# Patient Record
Sex: FEMALE | Race: WHITE
[De-identification: names, ages, dates, MRNs, and addresses within clinical notes are randomized per-mention and may not be internally consistent; named-entity substitution may affect disease eponyms.]

---

## 2021-01-15 PROBLEM — Z00.00 ENCOUNTER FOR PREVENTIVE HEALTH EXAMINATION: Status: ACTIVE | Noted: 2021-01-15

## 2021-01-21 ENCOUNTER — APPOINTMENT (OUTPATIENT)
Dept: CARDIOTHORACIC SURGERY | Facility: CLINIC | Age: 86
End: 2021-01-21
Payer: MEDICARE

## 2021-01-21 DIAGNOSIS — I27.20 PULMONARY HYPERTENSION, UNSPECIFIED: ICD-10-CM

## 2021-01-21 DIAGNOSIS — N18.30 CHRONIC KIDNEY DISEASE, STAGE 3 UNSPECIFIED: ICD-10-CM

## 2021-01-21 DIAGNOSIS — I25.9 CHRONIC ISCHEMIC HEART DISEASE, UNSPECIFIED: ICD-10-CM

## 2021-01-21 DIAGNOSIS — E78.5 HYPERLIPIDEMIA, UNSPECIFIED: ICD-10-CM

## 2021-01-21 DIAGNOSIS — I34.0 NONRHEUMATIC MITRAL (VALVE) INSUFFICIENCY: ICD-10-CM

## 2021-01-21 PROCEDURE — 99205 OFFICE O/P NEW HI 60 MIN: CPT

## 2021-01-25 VITALS
WEIGHT: 139 LBS | OXYGEN SATURATION: 85 % | BODY MASS INDEX: 26.24 KG/M2 | DIASTOLIC BLOOD PRESSURE: 80 MMHG | HEIGHT: 61 IN | SYSTOLIC BLOOD PRESSURE: 130 MMHG

## 2021-01-25 PROBLEM — I25.9 CHRONIC ISCHEMIC HEART DISEASE: Status: ACTIVE | Noted: 2021-01-25

## 2021-01-25 PROBLEM — I27.20 PULMONARY HYPERTENSION: Status: ACTIVE | Noted: 2021-01-25

## 2021-01-25 PROBLEM — N18.30 STAGE 3 CHRONIC KIDNEY DISEASE, UNSPECIFIED WHETHER STAGE 3A OR 3B CKD: Status: ACTIVE | Noted: 2021-01-25

## 2021-01-25 PROBLEM — I34.0 MITRAL VALVE REGURGITATION: Status: ACTIVE | Noted: 2021-01-25

## 2021-01-25 PROBLEM — E78.5 HYPERLIPEMIA: Status: ACTIVE | Noted: 2021-01-25

## 2021-01-25 RX ORDER — SIMVASTATIN 20 MG/1
20 TABLET, FILM COATED ORAL
Refills: 0 | Status: ACTIVE | COMMUNITY

## 2021-01-25 RX ORDER — FUROSEMIDE 20 MG/1
20 TABLET ORAL
Refills: 0 | Status: ACTIVE | COMMUNITY

## 2021-01-25 RX ORDER — MULTIVIT-MIN/IRON/FOLIC ACID/K 18-600-40
500 CAPSULE ORAL
Refills: 0 | Status: ACTIVE | COMMUNITY

## 2021-01-25 RX ORDER — LEVOTHYROXINE SODIUM 75 UG/1
75 TABLET ORAL
Refills: 0 | Status: ACTIVE | COMMUNITY

## 2021-01-25 RX ORDER — FAMOTIDINE 20 MG/1
20 TABLET, FILM COATED ORAL
Refills: 0 | Status: ACTIVE | COMMUNITY

## 2021-01-25 RX ORDER — PSYLLIUM HUSK 0.4 G
CAPSULE ORAL
Refills: 0 | Status: ACTIVE | COMMUNITY

## 2021-01-25 RX ORDER — ADHESIVE TAPE 3"X 2.3 YD
50 MCG TAPE, NON-MEDICATED TOPICAL
Refills: 0 | Status: ACTIVE | COMMUNITY

## 2021-01-25 RX ORDER — BIOTIN 10 MG
10000 TABLET ORAL
Refills: 0 | Status: ACTIVE | COMMUNITY

## 2021-01-25 RX ORDER — MAGNESIUM OXIDE/MAG AA CHELATE 300 MG
CAPSULE ORAL
Refills: 0 | Status: ACTIVE | COMMUNITY

## 2021-01-25 RX ORDER — SILDENAFIL 20 MG/1
20 TABLET ORAL
Refills: 0 | Status: ACTIVE | COMMUNITY

## 2021-01-25 RX ORDER — METOPROLOL SUCCINATE 25 MG/1
25 TABLET, EXTENDED RELEASE ORAL
Refills: 0 | Status: ACTIVE | COMMUNITY

## 2021-01-26 NOTE — PHYSICAL EXAM
[General Appearance - Alert] : alert [General Appearance - In No Acute Distress] : in no acute distress [Sclera] : the sclera and conjunctiva were normal [PERRL With Normal Accommodation] : pupils were equal in size, round, and reactive to light [Extraocular Movements] : extraocular movements were intact [Outer Ear] : the ears and nose were normal in appearance [Oropharynx] : the oropharynx was normal [Neck Cervical Mass (___cm)] : no neck mass was observed [Neck Appearance] : the appearance of the neck was normal [Jugular Venous Distention Increased] : there was no jugular-venous distention [Thyroid Diffuse Enlargement] : the thyroid was not enlarged [Thyroid Nodule] : there were no palpable thyroid nodules [Respiration, Rhythm And Depth] : normal respiratory rhythm and effort [Apical Impulse] : the apical impulse was normal [Heart Sounds] : normal S1 and S2 [Examination Of The Chest] : the chest was normal in appearance [FreeTextEntry1] : sternum well healed  [Varicose Veins Of The Right Leg] : the patient has no varicose veins of the right leg [Varicose Veins Of The Left Leg] : the patient has no varicose veins of the left leg [Bowel Sounds] : normal bowel sounds [Abdomen Soft] : soft [Abdomen Tenderness] : non-tender [Abdomen Mass (___ Cm)] : no abdominal mass palpated [No CVA Tenderness] : no ~M costovertebral angle tenderness [Abnormal Walk] : normal gait [Nail Clubbing] : no clubbing  or cyanosis of the fingernails [Musculoskeletal - Swelling] : no joint swelling seen [Motor Tone] : muscle strength and tone were normal [Skin Color & Pigmentation] : normal skin color and pigmentation [] : no rash [Skin Turgor] : normal skin turgor [Deep Tendon Reflexes (DTR)] : deep tendon reflexes were 2+ and symmetric [Sensation] : the sensory exam was normal to light touch and pinprick [No Focal Deficits] : no focal deficits [Oriented To Time, Place, And Person] : oriented to person, place, and time [Affect] : the affect was normal [Impaired Insight] : insight and judgment were intact

## 2021-01-26 NOTE — HISTORY OF PRESENT ILLNESS
[FreeTextEntry1] : Ms. Mora is a 89 year old female referred for consultation by Dr. Hernandes. She has a Past Medical History pertinent for CAD, CKD (Stage III), HTN, Anemia, COPD, CHF, Pulmonary Hypertension and Severe Mitral Valve Regurgitation.  In 2010 she underwent a bio-AVR with a 21mm Billy Mitroflow.  Patient reporting progressively worsening fatigue and SOB.  Now requiring oxygen mostly around the clock.  Multiple admissions for CHF.  Recent ECHO at [place] shows moderate to severe AI (PVL versus central not characterized on TTE), along with moderate to severe MR.\par \par The patient's past medical history, past surgical history, family history, social history, allergies, medications, and multisystem review of systems were individually reviewed with the patient.  There are no pertinent additions or subtractions.  The patient was personally seen and examined.\par

## 2021-01-26 NOTE — REVIEW OF SYSTEMS
[Feeling Poorly] : feeling poorly [Feeling Tired] : feeling tired [As Noted in HPI] : as noted in HPI [Shortness Of Breath] : shortness of breath [SOB on Exertion] : shortness of breath during exertion [Orthopnea] : orthopnea [Negative] : Heme/Lymph [Fever] : no fever [Chills] : no chills [Recent Weight Gain (___ Lbs)] : no recent weight gain [Recent Weight Loss (___ Lbs)] : no recent weight loss [Wheezing] : no wheezing [Cough] : no cough [PND] : no PND

## 2021-01-26 NOTE — ASSESSMENT
[FreeTextEntry1] : The patient is a very pleasant 89-year-old female. Her cardiovascular his factors include hypertension, coronary artery disease, and chronic renal insufficiency. She is status post aVR in 2010 with a 21 mm Mitroflow valve.  Operative report was reviewed. She also underwent coronary artery bypass grafting with LIMA to the LAD. The patient reports aggressively worsening exertional dyspnea. She is now on oxygen "most of the time." She reports worsening fatigue and lower extremity edema. She has suffered multiple admissions for acute on chronic diastolic heart failure. Most recently, in early January she was admitted to [facility]. Echocardiogram shows moderate to severe bioprostatic aortic valve regurgitation, and moderately severe mitral valve regurgitation. She is now referred for consideration for TAV in [place].\par \par I had a lengthy conversation with the patient about transcatheter valve replacement into her existing failed biologic valve. Additional workup however will be required. We will schedule both a LUCRECIA along with cardiac catheterization at [facility]. A LUCRECIA will characterize the aortic regurgitation as either central, versus paravalvular. We cannot effectively treat paravalvular regurgitation with a transcatheter approach. Having said this, the Billy Mitroflow valve is prone to structural deterioration with regurgitation as the dominant sequelae. The LUCRECIA will also quantify the mitral regurgitation. Down the road if TAv in [place] does not sufficiently improve her symptoms, she can be considered for mitral clip.\par \par Dedicated CT angiogram will also be required.  We'll obtain documentation about her renal insufficiency and consult with her nephrologist. Serum creatinine will need to be checked between each diagnostic procedure and ultimately prior to transcatheter aortic valve replacement.\par \par Risks benefits and alternatives to transcatheter aortic valve placement were discussed with the patient in detail.  Risks discussed included, but not limited to infection, bleeding, myocardial infarction, cerebrovascular accident, renal failure,  vascular injury requiring intervention, cardiac rupture, and death.  In addition, a roughly 5-10% risk of permanent pacemaker requirement was highlighted.   Furthermore, the patient's STS score and its significance were discussed directly with the patient and family.\par I would like to thank you for referring this patient to my attention and for allowing me to participate in her care.  If there are any further questions, or I can be of any further assistance, please do not hesitate contacting me at any time.\par

## 2021-01-29 ENCOUNTER — TRANSCRIPTION ENCOUNTER (OUTPATIENT)
Age: 86
End: 2021-01-29

## 2021-01-29 ENCOUNTER — OUTPATIENT (OUTPATIENT)
Dept: OUTPATIENT SERVICES | Facility: HOSPITAL | Age: 86
LOS: 1 days | End: 2021-01-29
Payer: MEDICARE

## 2021-02-19 PROBLEM — I34.0 NONRHEUMATIC MITRAL (VALVE) INSUFFICIENCY: Chronic | Status: ACTIVE | Noted: 2021-01-29

## 2021-02-19 PROBLEM — E78.5 HYPERLIPIDEMIA, UNSPECIFIED: Chronic | Status: ACTIVE | Noted: 2021-01-29

## 2021-02-19 PROBLEM — I25.10 ATHEROSCLEROTIC HEART DISEASE OF NATIVE CORONARY ARTERY WITHOUT ANGINA PECTORIS: Chronic | Status: ACTIVE | Noted: 2021-01-29

## 2021-02-19 PROBLEM — I27.20 PULMONARY HYPERTENSION, UNSPECIFIED: Chronic | Status: ACTIVE | Noted: 2021-01-29

## 2021-02-19 PROBLEM — N18.9 CHRONIC KIDNEY DISEASE, UNSPECIFIED: Chronic | Status: ACTIVE | Noted: 2021-01-29

## 2021-02-22 ENCOUNTER — INPATIENT (INPATIENT)
Facility: HOSPITAL | Age: 86
LOS: 2 days | Discharge: HOME | DRG: 291 | End: 2021-02-25
Attending: THORACIC SURGERY (CARDIOTHORACIC VASCULAR SURGERY) | Admitting: STUDENT IN AN ORGANIZED HEALTH CARE EDUCATION/TRAINING PROGRAM
Payer: MEDICARE

## 2021-02-22 VITALS
TEMPERATURE: 98 F | HEIGHT: 61 IN | WEIGHT: 136.03 LBS | OXYGEN SATURATION: 86 % | HEART RATE: 88 BPM | RESPIRATION RATE: 26 BRPM | SYSTOLIC BLOOD PRESSURE: 152 MMHG | DIASTOLIC BLOOD PRESSURE: 64 MMHG

## 2021-02-22 DIAGNOSIS — Z95.2 PRESENCE OF PROSTHETIC HEART VALVE: Chronic | ICD-10-CM

## 2021-02-22 DIAGNOSIS — I50.9 HEART FAILURE, UNSPECIFIED: ICD-10-CM

## 2021-02-22 DIAGNOSIS — Z98.890 OTHER SPECIFIED POSTPROCEDURAL STATES: Chronic | ICD-10-CM

## 2021-02-22 LAB
ALBUMIN SERPL ELPH-MCNC: 4.2 G/DL — SIGNIFICANT CHANGE UP (ref 3.3–5.2)
ALP SERPL-CCNC: 47 U/L — SIGNIFICANT CHANGE UP (ref 40–120)
ALT FLD-CCNC: 8 U/L — SIGNIFICANT CHANGE UP
ANION GAP SERPL CALC-SCNC: 13 MMOL/L — SIGNIFICANT CHANGE UP (ref 5–17)
APTT BLD: 32.2 SEC — SIGNIFICANT CHANGE UP (ref 27.5–35.5)
AST SERPL-CCNC: 21 U/L — SIGNIFICANT CHANGE UP
BASOPHILS # BLD AUTO: 0.07 K/UL — SIGNIFICANT CHANGE UP (ref 0–0.2)
BASOPHILS NFR BLD AUTO: 0.7 % — SIGNIFICANT CHANGE UP (ref 0–2)
BILIRUB SERPL-MCNC: 0.5 MG/DL — SIGNIFICANT CHANGE UP (ref 0.4–2)
BUN SERPL-MCNC: 29 MG/DL — HIGH (ref 8–20)
CALCIUM SERPL-MCNC: 10.3 MG/DL — HIGH (ref 8.6–10.2)
CHLORIDE SERPL-SCNC: 108 MMOL/L — HIGH (ref 98–107)
CO2 SERPL-SCNC: 19 MMOL/L — LOW (ref 22–29)
CREAT SERPL-MCNC: 1.57 MG/DL — HIGH (ref 0.5–1.3)
EOSINOPHIL # BLD AUTO: 0.12 K/UL — SIGNIFICANT CHANGE UP (ref 0–0.5)
EOSINOPHIL NFR BLD AUTO: 1.2 % — SIGNIFICANT CHANGE UP (ref 0–6)
GLUCOSE SERPL-MCNC: 83 MG/DL — SIGNIFICANT CHANGE UP (ref 70–99)
HCT VFR BLD CALC: 34.3 % — LOW (ref 34.5–45)
HGB BLD-MCNC: 11 G/DL — LOW (ref 11.5–15.5)
IMM GRANULOCYTES NFR BLD AUTO: 0.4 % — SIGNIFICANT CHANGE UP (ref 0–1.5)
INR BLD: 1 RATIO — SIGNIFICANT CHANGE UP (ref 0.88–1.16)
LYMPHOCYTES # BLD AUTO: 0.93 K/UL — LOW (ref 1–3.3)
LYMPHOCYTES # BLD AUTO: 9.5 % — LOW (ref 13–44)
MAGNESIUM SERPL-MCNC: 1.8 MG/DL — SIGNIFICANT CHANGE UP (ref 1.6–2.6)
MCHC RBC-ENTMCNC: 27.8 PG — SIGNIFICANT CHANGE UP (ref 27–34)
MCHC RBC-ENTMCNC: 32.1 GM/DL — SIGNIFICANT CHANGE UP (ref 32–36)
MCV RBC AUTO: 86.8 FL — SIGNIFICANT CHANGE UP (ref 80–100)
MONOCYTES # BLD AUTO: 0.85 K/UL — SIGNIFICANT CHANGE UP (ref 0–0.9)
MONOCYTES NFR BLD AUTO: 8.7 % — SIGNIFICANT CHANGE UP (ref 2–14)
NEUTROPHILS # BLD AUTO: 7.79 K/UL — HIGH (ref 1.8–7.4)
NEUTROPHILS NFR BLD AUTO: 79.5 % — HIGH (ref 43–77)
NT-PROBNP SERPL-SCNC: 2903 PG/ML — HIGH (ref 0–300)
PLATELET # BLD AUTO: 223 K/UL — SIGNIFICANT CHANGE UP (ref 150–400)
POTASSIUM SERPL-MCNC: 4.1 MMOL/L — SIGNIFICANT CHANGE UP (ref 3.5–5.3)
POTASSIUM SERPL-SCNC: 4.1 MMOL/L — SIGNIFICANT CHANGE UP (ref 3.5–5.3)
PROT SERPL-MCNC: 7.6 G/DL — SIGNIFICANT CHANGE UP (ref 6.6–8.7)
PROTHROM AB SERPL-ACNC: 11.6 SEC — SIGNIFICANT CHANGE UP (ref 10.6–13.6)
RBC # BLD: 3.95 M/UL — SIGNIFICANT CHANGE UP (ref 3.8–5.2)
RBC # FLD: 15.8 % — HIGH (ref 10.3–14.5)
SARS-COV-2 RNA SPEC QL NAA+PROBE: SIGNIFICANT CHANGE UP
SODIUM SERPL-SCNC: 140 MMOL/L — SIGNIFICANT CHANGE UP (ref 135–145)
TROPONIN T SERPL-MCNC: 0.02 NG/ML — SIGNIFICANT CHANGE UP (ref 0–0.06)
WBC # BLD: 9.8 K/UL — SIGNIFICANT CHANGE UP (ref 3.8–10.5)
WBC # FLD AUTO: 9.8 K/UL — SIGNIFICANT CHANGE UP (ref 3.8–10.5)

## 2021-02-22 PROCEDURE — 71045 X-RAY EXAM CHEST 1 VIEW: CPT | Mod: 26

## 2021-02-22 PROCEDURE — 99284 EMERGENCY DEPT VISIT MOD MDM: CPT | Mod: GC

## 2021-02-22 PROCEDURE — 93010 ELECTROCARDIOGRAM REPORT: CPT

## 2021-02-22 PROCEDURE — 99223 1ST HOSP IP/OBS HIGH 75: CPT

## 2021-02-22 RX ORDER — LEVOTHYROXINE SODIUM 125 MCG
75 TABLET ORAL DAILY
Refills: 0 | Status: DISCONTINUED | OUTPATIENT
Start: 2021-02-22 | End: 2021-02-25

## 2021-02-22 RX ORDER — ASPIRIN/CALCIUM CARB/MAGNESIUM 324 MG
325 TABLET ORAL ONCE
Refills: 0 | Status: COMPLETED | OUTPATIENT
Start: 2021-02-22 | End: 2021-02-22

## 2021-02-22 RX ORDER — SIMVASTATIN 20 MG/1
20 TABLET, FILM COATED ORAL AT BEDTIME
Refills: 0 | Status: DISCONTINUED | OUTPATIENT
Start: 2021-02-22 | End: 2021-02-25

## 2021-02-22 RX ORDER — METOPROLOL TARTRATE 50 MG
12.5 TABLET ORAL
Refills: 0 | Status: DISCONTINUED | OUTPATIENT
Start: 2021-02-23 | End: 2021-02-25

## 2021-02-22 RX ORDER — SODIUM CHLORIDE 9 MG/ML
3 INJECTION INTRAMUSCULAR; INTRAVENOUS; SUBCUTANEOUS EVERY 8 HOURS
Refills: 0 | Status: DISCONTINUED | OUTPATIENT
Start: 2021-02-22 | End: 2021-02-25

## 2021-02-22 RX ORDER — NITROGLYCERIN 6.5 MG
1 CAPSULE, EXTENDED RELEASE ORAL ONCE
Refills: 0 | Status: COMPLETED | OUTPATIENT
Start: 2021-02-22 | End: 2021-02-22

## 2021-02-22 RX ORDER — METOPROLOL TARTRATE 50 MG
25 TABLET ORAL DAILY
Refills: 0 | Status: DISCONTINUED | OUTPATIENT
Start: 2021-02-22 | End: 2021-02-22

## 2021-02-22 RX ORDER — AMLODIPINE BESYLATE 2.5 MG/1
10 TABLET ORAL DAILY
Refills: 0 | Status: DISCONTINUED | OUTPATIENT
Start: 2021-02-22 | End: 2021-02-23

## 2021-02-22 RX ORDER — HEPARIN SODIUM 5000 [USP'U]/ML
5000 INJECTION INTRAVENOUS; SUBCUTANEOUS EVERY 12 HOURS
Refills: 0 | Status: DISCONTINUED | OUTPATIENT
Start: 2021-02-22 | End: 2021-02-25

## 2021-02-22 RX ORDER — FUROSEMIDE 40 MG
20 TABLET ORAL ONCE
Refills: 0 | Status: COMPLETED | OUTPATIENT
Start: 2021-02-22 | End: 2021-02-22

## 2021-02-22 RX ADMIN — HEPARIN SODIUM 5000 UNIT(S): 5000 INJECTION INTRAVENOUS; SUBCUTANEOUS at 17:37

## 2021-02-22 RX ADMIN — Medication 20 MILLIGRAM(S): at 15:06

## 2021-02-22 RX ADMIN — Medication 20 MILLIGRAM(S): at 23:15

## 2021-02-22 RX ADMIN — SODIUM CHLORIDE 3 MILLILITER(S): 9 INJECTION INTRAMUSCULAR; INTRAVENOUS; SUBCUTANEOUS at 23:15

## 2021-02-22 RX ADMIN — SIMVASTATIN 20 MILLIGRAM(S): 20 TABLET, FILM COATED ORAL at 23:15

## 2021-02-22 NOTE — ED PROVIDER NOTE - PMH
CAD (coronary artery disease)    Chronic kidney disease, stage 3 to stage 5    HLD (hyperlipidemia)    Mitral valve regurgitation    Pulmonary HTN

## 2021-02-22 NOTE — CONSULT NOTE ADULT - ASSESSMENT
89 y/o F with PMH of HTN, anemia, COPD on home O2, HLD, CKD stage 3, severe mitral valve regurgitation, pHTN on Sildenafil, CAD with one vessel CABG (LIMA to LAD) with bio-prosthetic AVR (2010) presents with shortness of breath, worsening over the last 2 days. The patient is in the process of being worked up for a TAVR.     -Admit to CT Surgery - Dr. Denny service    Acute on chronic diastolic heart failure  - Lasix 20mg IV x 1 (given) will start standing diuretic  - c/w Metoprolol 25mg daily  -Monitor I/O  -Daily CXR  -Will discuss with Dr. Denny/Cards timing of cath and TAVR    Aortic insufficiency of bioaortic valve  - s/p AV replacement 11 years ago  - follows with Dr. Denny  - CT surgery made aware  - LUCRECIA from 1/29 noted    CAD   - c/w Metoprolol 25mg daily  - c/w Zocor 20mg daily    COPD on home O2  - c/w supplemental O2    Hypothyroidism  - c/w Synthroid 75mcg daily    Pulmonary hypertension  - c/w Sildenafil 20mg TID    DVT ppx  - Heparin SQ 89 y/o F with PMH of HTN, anemia, COPD on home O2, HLD, CKD stage 3, severe mitral valve regurgitation, pHTN on Sildenafil, CAD with one vessel CABG (LIMA to LAD) with bio-prosthetic AVR (2010) presents with shortness of breath, worsening over the last 2 days. The patient is in the process of being worked up for a TAVR.     -Admit to CT Surgery - Dr. Denny service    Acute on chronic diastolic heart failure  - Lasix 20mg IV x 1 (given) will start standing diuretic  - c/w Metoprolol 25mg daily  -Monitor I/O  -Daily CXR  -Will discuss with Dr. Denny/Cards timing of cath and TAVR CTA while inpatient in preparation for SO TAVR    Aortic insufficiency of bioaortic valve  - s/p AV replacement 11 years ago  - follows with Dr. Denny  - CT surgery made aware  - LUCRECIA from 1/29 noted    CAD   - c/w Metoprolol 25mg daily  - c/w Zocor 20mg daily    COPD on home O2  - c/w supplemental O2    Hypothyroidism  - c/w Synthroid 75mcg daily    Pulmonary hypertension  - c/w Sildenafil 20mg TID    DVT ppx  - Heparin SQ 89 y/o F with PMH of HTN, anemia, COPD on home O2, HLD, CKD stage 3, severe mitral valve regurgitation, pHTN on Sildenafil, CAD with one vessel CABG (LIMA to LAD) with bio-prosthetic AVR (2010) presents with shortness of breath, worsening over the last 2 days. The patient is in the process of being worked up for a TAVR.     -Admit to CT Surgery - Dr. Denny service    Acute on chronic diastolic heart failure  - Lasix 20mg IV x 1 (given) will start standing diuretic  - c/w Metoprolol 25mg daily  -Monitor I/O  -Daily CXR  -Will discuss with Dr. Denny/Roel timing of cath and TAVR CTA while inpatient in preparation for SO TAVR    Left Pleural effusion on CXR  -will perform bedside US and place pigtail if indicated.  May help alleviate symptoms.    Aortic insufficiency of bioaortic valve  - s/p AV replacement 11 years ago  - follows with Dr. Denny  - CT surgery made aware  - LUCRECIA from 1/29 noted    CAD   - c/w Metoprolol 25mg daily  - c/w Zocor 20mg daily    COPD on home O2  - c/w supplemental O2    Hypothyroidism  - c/w Synthroid 75mcg daily    Pulmonary hypertension  - c/w Sildenafil 20mg TID    DVT ppx  - Heparin SQ

## 2021-02-22 NOTE — ED PROVIDER NOTE - ATTENDING CONTRIBUTION TO CARE
The patient seen and examined.    Acute CHF    I, Brigido Ortega, performed the initial face to face bedside interview with this patient regarding history of present illness, review of symptoms and relevant past medical, social and family history.  I completed an independent physical examination.  I was the initial provider who evaluated this patient. I have signed out the follow up of any pending tests (i.e. labs, radiological studies) to the resident.  I have communicated the patient’s plan of care and disposition with the resident.

## 2021-02-22 NOTE — CONSULT NOTE ADULT - SUBJECTIVE AND OBJECTIVE BOX
Surgeon: Denisha    Consult requesting by: Hospitalist    HISTORY OF PRESENT ILLNESS:  89y Female with degenerative bioprosthetic AI, and mod-sever MR, with multiple admission for acute on chronic diastolic heart failure due to valvular heart disease.  She has a PMH of HTN, anemia, COPD on home O2, HLD, CKD stage 3, severe mitral valve regurgitation, pHTN on Sildenafil, CAD with one vessel CABG (LIMA to LAD) with bio-prosthetic AVR (2010) presents with shortness of breath. The patient states in the past 2 days she has become more short of breath and last night was unable to sleep flat. She states that in the last day she was unable ot even walk a few feet to the bathroom without becoming short of breath and had to sleep on 3 pillows overnight.       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    Pulmonary HTN    Mitral valve regurgitation    HLD (hyperlipidemia)    CAD (coronary artery disease)    H/O hernia repair    H/O aortic valve replacement  21mm Billy Nunez (2010)        MEDICATIONS  (STANDING):  amLODIPine   Tablet 10 milliGRAM(s) Oral daily  heparin   Injectable 5000 Unit(s) SubCutaneous every 12 hours  levothyroxine 75 MICROGram(s) Oral daily  metoprolol tartrate 25 milliGRAM(s) Oral daily  sildenafil (REVATIO) 20 milliGRAM(s) Oral three times a day  simvastatin 20 milliGRAM(s) Oral at bedtime    MEDICATIONS  (PRN):    Antiplatelet therapy:                           Last dose/amt:    Allergies    No Known Allergies    Intolerances        SOCIAL HISTORY:  Smoker: [ ] Yes  [ ] No        PACK YEARS:                         WHEN QUIT?  ETOH use: [ ] Yes  [ ] No              FREQUENCY / QUANTITY:  Ilicit Drug use:  [ ] Yes  [ ] No  Occupation:  Live with:  Assisted device use:    FAMILY HISTORY:      Review of Systems: Review of Systems:  CONSTITUTIONAL: No weakness, fevers or chills  EYES/ENT: No visual changes;  No vertigo or throat pain   NECK: No pain or stiffness  RESPIRATORY: +shortness of breath, No cough, wheezing, hemoptysis;   CARDIOVASCULAR: No chest pain or palpitations  GASTROINTESTINAL: No abdominal or epigastric pain. No nausea, vomiting, or hematemesis; No diarrhea or constipation.   GENITOURINARY: No dysuria, frequency or hematuria  NEUROLOGICAL: No numbness or weakness  SKIN: No itching, burning, rashes, or lesions   All other review of systems is negative unless indicated above    PHYSICAL EXAM  Vital Signs Last 24 Hrs  T(C): 36.6 (22 Feb 2021 16:16), Max: 36.9 (22 Feb 2021 14:33)  T(F): 97.8 (22 Feb 2021 16:16), Max: 98.5 (22 Feb 2021 14:33)  HR: 75 (22 Feb 2021 16:16) (74 - 97)  BP: 135/64 (22 Feb 2021 16:16) (132/63 - 152/64)  BP(mean): --  RR: 20 (22 Feb 2021 16:16) (20 - 26)  SpO2: 96% (22 Feb 2021 16:16) (86% - 98%)    Physical Exam:  Constitutional: alert and oriented, in no acute distress   Neck: Soft and supple  Respiratory: Bibasilar crackles to auscultation   Cardiovascular: Regular rate and rhythm  Gastrointestinal: Soft, non-tender to palpation, +bs  Vascular: 2+ peripheral pulses  Neurological: A/O x 3, no focal neurological deficits  Musculoskeletal: trace bilateral lower extremity edema  Skin: no rashes  Psych: answering questions appropriately  LABS:                        11.0   9.80  )-----------( 223      ( 22 Feb 2021 11:52 )             34.3     02-22    140  |  108<H>  |  29.0<H>  ----------------------------<  83  4.1   |  19.0<L>  |  1.57<H>    Ca    10.3<H>      22 Feb 2021 11:52  Mg     1.8     02-22    TPro  7.6  /  Alb  4.2  /  TBili  0.5  /  DBili  x   /  AST  21  /  ALT  8   /  AlkPhos  47  02-22    PT/INR - ( 22 Feb 2021 11:52 )   PT: 11.6 sec;   INR: 1.00 ratio         PTT - ( 22 Feb 2021 11:52 )  PTT:32.2 sec    CARDIAC MARKERS ( 22 Feb 2021 11:52 )  x     / 0.02 ng/mL / x     / x     / x              Cardiac Cath: Pending    TTE / LUCRECIA:      Remarks from Surgeon when pt seen in the office:    The patient is a very pleasant 89-year-old female. Her cardiovascular his factors include hypertension, coronary artery disease, and chronic renal insufficiency. She is status post aVR in 2010 with a 21 mm Mitroflow valve. Operative report was reviewed. She also underwent coronary artery bypass grafting with LIMA to the LAD. The patient reports aggressively worsening exertional dyspnea. She is now on oxygen "most of the time." She reports worsening fatigue and lower extremity edema. She has suffered multiple admissions for acute on chronic diastolic heart failure. Most recently, in early January she was admitted to [facility]. Echocardiogram shows moderate to severe bioprostatic aortic valve regurgitation, and moderately severe mitral valve regurgitation. She is now referred for consideration for TAV in [place].    I had a lengthy conversation with the patient about transcatheter valve replacement into her existing failed biologic valve. Additional workup however will be required. We will schedule both a LUCRECIA along with cardiac catheterization at [facility]. A LUCRECIA will characterize the aortic regurgitation as either central, versus paravalvular. We cannot effectively treat paravalvular regurgitation with a transcatheter approach. Having said this, the Billy Mitroflow valve is prone to structural deterioration with regurgitation as the dominant sequelae. The LUCRECIA will also quantify the mitral regurgitation. Down the road if TAv in [place] does not sufficiently improve her symptoms, she can be considered for mitral clip.    Dedicated CT angiogram will also be required. We'll obtain documentation about her renal insufficiency and consult with her nephrologist. Serum creatinine will need to be checked between each diagnostic procedure and ultimately prior to transcatheter aortic valve replacement.    Risks benefits and alternatives to transcatheter aortic valve placement were discussed with the patient in detail. Risks discussed included, but not limited to infection, bleeding, myocardial infarction, cerebrovascular accident, renal failure, vascular injury requiring intervention, cardiac rupture, and death. In addition, a roughly 5-10% risk of permanent pacemaker requirement was highlighted. Furthermore, the patient's STS score and its significance were discussed directly with the patient and family.  I would like to thank you for referring this patient to my attention and for allowing me to participate in her care. If there are any further questions, or I can be of any further assistance, please do not hesitate contacting me at any time.

## 2021-02-22 NOTE — CONSULT NOTE ADULT - ASSESSMENT
full note to follow     hpervolemic  Lasix 20mg IV P x 1  monitor renal function, recent CHAIM  CT Sx aware pt here    89 year old female referred for consultation by Dr. Hernandes. She has a Past Medical History pertinent for CAD, CKD (Stage III), HTN, Anemia, COPD on O2 at home, CHF- diastolic , Pulmonary Hypertension and Severe Mitral Valve Regurgitation, bio AVE 2010 presents with SOB. Pt being worked up for AV. Recent LUCRECIA with severe AI. unabel to have LHC 2/2 CHAIM, cr 2.8, lasix stopped at that time. Today presents to ED with worsening shortness of breath, dyspnea, orthopnea.    Acute on Chronic Diastolic Heart Failure   hypervolemic on exam  Lasix 20mg IV P x 1  monitor renal function, recent CHAIM    Aortic insufficency of Bio aortic valve  Aortic valve replacement 20 years ago  following with D  CT Sx aware pt here    89 year old female referred for consultation by Dr. Hernandes. She has a Past Medical History pertinent for CAD, CKD (Stage III), HTN, Anemia, COPD on O2 at home, CHF- diastolic , Pulmonary Hypertension and Severe Mitral Valve Regurgitation, bio AVE 2010 presents with SOB. Pt being worked up for AV. Recent LUCRECIA with severe AI. unable to have LHC 2/2 CHAIM, cr 2.8, lasix stopped at that time. Today presents to ED with worsening shortness of breath, dyspnea, orthopnea.    Dyspnea  multifactorial hx COPD, diastolic HF, Aortic insufficiency pulm HTN    Acute on Chronic Diastolic Heart Failure   hypervolemic on exam  Lasix 20mg IV P x 1  monitor renal function, recent CHAIM  monitor i and Os    Aortic insufficiency of Bio aortic valve  Aortic valve replacement 20 years ago  following with Dr Denisha WYNN Sx aware pt here  LUCRECIA as above    CAD  LHC 2018 LIMA to LAD patent   cont home medications  unable to complete ischemic work up CTA/cath last month 2/2 CHAIM    Pulm HTN  cont sildenafil

## 2021-02-22 NOTE — H&P ADULT - NSICDXPASTSURGICALHX_GEN_ALL_CORE_FT
PAST SURGICAL HISTORY:  H/O aortic valve replacement 21mm Billy Nunez (2010)    H/O hernia repair

## 2021-02-22 NOTE — CONSULT NOTE ADULT - ATTENDING COMMENTS
dyspnea on exertion : severe proshtetic valve AI. central AI.   acute renal failure.  plan for Cardiac cta for valve in valve tavr evaluation.  ct home meds.  will get CT surgery evaluation for inpatietn work up as patient lives along and is uncomfortable.

## 2021-02-22 NOTE — H&P ADULT - ASSESSMENT
89 y/o F with PMH of HTN, anemia, COPD on home O2, HLD, CKD stage 3, severe mitral valve regurgitation, pHTN on Sildenafil, CAD with one vessel CABG (LIMA to LAD) with bio-prosthetic AVR (2010) presents with shortness of breath, worsening over the last 2 days. The patient is in the process of being worked up for a TAVR. Recent LUCRECIA shows severe AI. The patient was scheduled to have a LHC but was unable to secondary to CHAIM with a creatinine of 2.8, Lasix was held at that time.     Acute on chronic diastolic heart failure  - Lasix 20mg IV x 1  - ProBNP 2903  - c/w Metoprolol 25mg daily    Aortic insufficiency of bioaortic valve  - s/p AV replacement 20 years ago  - follows with Dr. Denny  - CT surgery made aware  - LUCRECIA from 1/29 noted    CAD   - c/w Metoprolol 25mg daily  - c/w Zocor 20mg daily    COPD on home O2  - c/w supplemental O2    Hypothyroidism  - c/w Synthroid 75mcg daily    Pulmonary hypertension  - c/w Sildenafil 20mg TID    DVT ppx  - SCD 89 y/o F with PMH of HTN, anemia, COPD on home O2, HLD, CKD stage 3, severe mitral valve regurgitation, pHTN on Sildenafil, CAD with one vessel CABG (LIMA to LAD) with bio-prosthetic AVR (2010) presents with shortness of breath, worsening over the last 2 days. The patient is in the process of being worked up for a TAVR.     Shortness of breath in the setting of Acute on chronic diastolic heart failure  - Lasix 20mg IV x 1  - ProBNP 2903  - c/w Metoprolol 25mg daily    Aortic insufficiency of bioaortic valve  - s/p AV replacement 20 years ago  - follows with Dr. Denny  - CT surgery made aware  - LUCRECIA from 1/29 noted    CAD   - c/w Metoprolol 25mg daily  - c/w Zocor 20mg daily    COPD on home O2  - c/w supplemental O2    Hypothyroidism  - c/w Synthroid 75mcg daily    Pulmonary hypertension  - c/w Sildenafil 20mg TID    DVT ppx  - SCD 89 y/o F with PMH of HTN, anemia, COPD on home O2, HLD, CKD stage 3, severe mitral valve regurgitation, pHTN on Sildenafil, CAD with one vessel CABG (LIMA to LAD) with bio-prosthetic AVR (2010) presents with shortness of breath, worsening over the last 2 days. The patient is in the process of being worked up for a TAVR.     Shortness of breath in the setting of Acute on chronic diastolic heart failure  - Lasix 20mg IV x 1  - ProBNP 2903  - c/w Metoprolol 25mg daily    Aortic insufficiency of bioaortic valve  - s/p AV replacement 20 years ago  - follows with Dr. Denny  - CT surgery made aware  - LUCRECIA from 1/29 noted    CAD   - c/w Metoprolol 25mg daily  - c/w Zocor 20mg daily    COPD on home O2  - c/w supplemental O2    Hypothyroidism  - c/w Synthroid 75mcg daily    Pulmonary hypertension  - c/w Sildenafil 20mg TID    DVT ppx  - Heparin SQ

## 2021-02-22 NOTE — ED PROVIDER NOTE - DISPOSITION TYPE
[facility]  Marco A Childs  : 1991  MRN: [id number]  CSN: [id number]    Postpartum Day #2  Subjective   The patient had mild abdominal pain this am that was relieved by motrin. She reports eating and ambulating well. She is able to use the bathroom by herself and has had a BM. She reports her bleeding is about the same as a normal period.      Objective     Min/max vitals past 24 hours:   Temp  Min: 97.9 °F (36.6 °C)  Max: 98.2 °F (36.8 °C)  BP  Min: 105/70  Max: 120/78  Pulse  Min: 91  Max: 117  Pulse  Min: 91  Max: 117        Abdomen: soft, non-tender; bowel sounds normal; no masses   fundus firm and non-tender   Calves: Nontender, no cords palpable   Pelvic: deferred     Lab Results   Component Value Date    WBC 9.97 11/10/2019    HGB 10.2 (L) 11/10/2019    HCT 29.9 (L) 11/10/2019    MCV 84.0 11/10/2019     11/10/2019    RH Positive 2019    HEPBSAG Negative 2019        Assessment   1. Postpartum Day #2 S/P vaginal delivery, healing and improving appropriately     Plan   1. Continue routine postpartum care  2. Discharge to home today.  3. Follow-up in clinic in 4 to 6 weeks      This document has been electronically signed by Justine Adorno, Medical Student on 2019 6:16 AM    I have seen and evaluated the patient.  I have discussed the case with the medical student. I have reviewed the notes, assessment and plan, and/or procedures performed by the medical student. I concur with the medical student’s documentation with minor edits.        This document has been electronically signed by Helder Mckeon DO on 2019 6:41 AM    
[facility]  Marco A Childs  : 1991  MRN: [id number]  CSN: [id number]    Postpartum Day #1  Subjective   Patient without complaints this morning, ambulating without difficulty.  Tolerating a regular diet.  Urinating without difficulty.  Has passed flatus.  States that bleeding is moderate at this time and not bothersome.  Patient is breast-feeding and doing well.  Does not want anything for contraception at this time will reconsider at her postpartum visit.     Objective     Min/max vitals past 24 hours:   Temp  Min: 97.6 °F (36.4 °C)  Max: 98.3 °F (36.8 °C)  BP  Min: 103/63  Max: 154/104  Pulse  Min: 92  Max: 126  Pulse  Min: 92  Max: 126        Abdomen: soft, non-tender; bowel sounds normal; no masses   fundus firm and non-tender   Calves: Nontender, no cords palpable   Pelvic: deferred     Lab Results   Component Value Date    WBC 9.97 11/10/2019    HGB 10.2 (L) 11/10/2019    HCT 29.9 (L) 11/10/2019    MCV 84.0 11/10/2019     11/10/2019    RH Positive 2019    HEPBSAG Negative 2019        Assessment   1. Postpartum Day #1 S/P vaginal delivery doing well and recovering appropriately at this time.  Vital signs stable.  Appropriate change in H&H.  Afebrile.     Plan   1. Continue routine postpartum care  2. Likely discharged home tomorrow.          This document has been electronically signed by Helder Mckeon DO on November 10, 2019 7:20 AM    
ADMIT

## 2021-02-22 NOTE — H&P ADULT - HISTORY OF PRESENT ILLNESS
89 y/o F with PMH of HTN, anemia, COPD on home O2, HLD, CKD stage 3, severe mitral valve regurgitation, pHTN on Sildenafil, CAD with one vessel CABG (LIMA to LAD) with bio-prosthetic AVR (2010) presents with shortness of breath. The patient states in the past 2 days she has become more short of breath and last night was unable to sleep flat. She states that in the last day she was unable ot even walk a few feet to the bathroom without becoming short of breath and had to sleep on 3 pillows overnight.     The patient is in the process of being worked up for a TAVR. Recent LUCRECIA shows severe AI. The patient was scheduled to have a LHC but was unable to secondary to CHAIM with a creatinine of 2.8, Lasix was held at that time.

## 2021-02-22 NOTE — H&P ADULT - NSHPREVIEWOFSYSTEMS_GEN_ALL_CORE
Review of Systems:  CONSTITUTIONAL: No weakness, fevers or chills  EYES/ENT: No visual changes;  No vertigo or throat pain   NECK: No pain or stiffness  RESPIRATORY: +shortness of breath, No cough, wheezing, hemoptysis;   CARDIOVASCULAR: No chest pain or palpitations  GASTROINTESTINAL: No abdominal or epigastric pain. No nausea, vomiting, or hematemesis; No diarrhea or constipation.   GENITOURINARY: No dysuria, frequency or hematuria  NEUROLOGICAL: No numbness or weakness  SKIN: No itching, burning, rashes, or lesions   All other review of systems is negative unless indicated above

## 2021-02-22 NOTE — H&P ADULT - NSHPLABSRESULTS_GEN_ALL_CORE
11.0   9.80  )-----------( 223      ( 22 Feb 2021 11:52 )             34.3   02-22    140  |  108<H>  |  29.0<H>  ----------------------------<  83  4.1   |  19.0<L>  |  1.57<H>    Ca    10.3<H>      22 Feb 2021 11:52  Mg     1.8     02-22    TPro  7.6  /  Alb  4.2  /  TBili  0.5  /  DBili  x   /  AST  21  /  ALT  8   /  AlkPhos  47  02-22    ProBNP 2900

## 2021-02-22 NOTE — CONSULT NOTE ADULT - SUBJECTIVE AND OBJECTIVE BOX
[facility]                                                               Office:  [address] [address]                                                              Telephone: [phone]. Fax:[phone]                                                                        CARDIOLOGY CONSULTATION NOTE                                                                                             Consult requested by:  Dr. Ortega  Reason for Consultation:  COPD  History obtained by: Patient and medical record   obtained: No  Cardiologist: Denisha Wolfe    Chief complaint:    Patient is a 89y old  Female who presents with a chief complaint of duifficulty breathing       HPI:        REVIEW OF SYMPTOMS:     CONSTITUTIONAL: No fever, no weight loss, no fatigue, no weight gain   ENMT:  No difficulty hearing, tinnitus, vertigo; No sinus or throat pain  NECK: No pain or stiffness  CARDIOVASCULAR: No chest pain, no dyspnea, no syncope, no palpitations, no dizziness, no Orthopnea, no Paroxsymal nocturnal dyspnea  RESPIRATORY: No Dyspnea on exertion, no Shortness of breath, no cough, no wheezing  : No dysuria, no hematuria   GI: No dark color stool, no melena, no diarrhea, no constipation, no abdominal pain   NEURO: No headache, no dizziness, no slurred speech   MUSCULOSKELETAL: No joint pain or swelling; No muscle, back, or extremity pain  PSYCH: No agitation, no anxiety.    ALL OTHER REVIEW OF SYSTEMS ARE NEGATIVE.      PREVIOUS DIAGNOSTIC TESTING  ECHO FINDINGS:      STRESS FINDINGS:      CATHETERIZATION FINDINGS:         ALLERGIES: Allergies    No Known Allergies    Intolerances          PAST MEDICAL HISTORY  Chronic kidney disease, stage 3 to stage 5    Pulmonary HTN    Mitral valve regurgitation    HLD (hyperlipidemia)    CAD (coronary artery disease)        PAST SURGICAL HISTORY  H/O hernia repair    H/O aortic valve replacement        FAMILY HISTORY:      SOCIAL HISTORY:  Denies smoking/alcohol/drugs  CIGARETTES:     ALCOHOL:  DRUGS:      CURRENT MEDICATIONS:  furosemide   Injectable 20 milliGRAM(s) IV Push once           HOME MEDICATIONS:      Vital Signs Last 24 Hrs  T(C): 35.9 (22 Feb 2021 11:13), Max: 36.6 (22 Feb 2021 09:42)  T(F): 96.7 (22 Feb 2021 11:13), Max: 97.9 (22 Feb 2021 09:42)  HR: 74 (22 Feb 2021 12:57) (74 - 97)  BP: 150/66 (22 Feb 2021 12:57) (132/63 - 152/64)  BP(mean): --  RR: 20 (22 Feb 2021 12:57) (20 - 26)  SpO2: 98% (22 Feb 2021 12:57) (86% - 98%)      PHYSICAL EXAM:  Constitutional: Comfortable . No acute distress.   HEENT: Atraumatic and normocephalic , neck is supple . no JVD. No carotid bruit. PEERL   CNS: A&Ox3. No focal deficits. EOMI. Cranial nerves II-IX are intact.   Respiratory: CTAB, ulabored   Cardiovascular: S1S2 RRR. No murmur/rubs or gallop.  Gastrointestinal: Soft non-tender and non distended . +Bowel sounds. negative Amin's sign.  Extremities: No edema.   Psychiatric: Calm . no agitation.  Skin: No skin rash/ulcers visualized to face, hands or feet.    Intake and output:     LABS:                        11.0   9.80  )-----------( 223      ( 22 Feb 2021 11:52 )             34.3     02-22    140  |  108<H>  |  29.0<H>  ----------------------------<  83  4.1   |  19.0<L>  |  1.57<H>    Ca    10.3<H>      22 Feb 2021 11:52  Mg     1.8     02-22    TPro  7.6  /  Alb  4.2  /  TBili  0.5  /  DBili  x   /  AST  21  /  ALT  8   /  AlkPhos  47  02-22    CARDIAC MARKERS ( 22 Feb 2021 11:52 )  x     / 0.02 ng/mL / x     / x     / x        ;p-BNP=Serum Pro-Brain Natriuretic Peptide: 2903 pg/mL (02-22 @ 11:52)    PT/INR - ( 22 Feb 2021 11:52 )   PT: 11.6 sec;   INR: 1.00 ratio         PTT - ( 22 Feb 2021 11:52 )  PTT:32.2 sec      INTERPRETATION OF TELEMETRY: Reviewed by me.   ECG: Reviewed by me.     RADIOLOGY & ADDITIONAL STUDIES:    X-ray:  reviewed by me.   CT scan:   MRI:                                                                          [facility]                                                               Office:  [address] [place] [address]                                                              Telephone: [phone]. Fax:[phone]                                                                        CARDIOLOGY CONSULTATION NOTE                                                                                             Consult requested by:  Dr. Ortega  Reason for Consultation:  COPD  History obtained by: Patient and medical record   obtained: No  Cardiologist: Denisha Wolfe    Chief complaint:    Patient is a 89y old  Female who presents with a chief complaint of duifficulty breathing       HPI: 89 year old female referred for consultation by Dr. Hernandes. She has a Past Medical History pertinent for CAD, CKD (Stage III), HTN, Anemia, COPD on O2 at home, CHF, Pulmonary Hypertension and Severe Mitral Valve Regurgitation, bio AVE 2010 presents with SOB. Pt being worked up for AV. Recent LUCRECIA with severe AI. unabel to have LHC 2/2 CHAIM, cr 2.8, lasix stopped at that time. Today presents to ED with worsening shortness of breath, dyspnea, orthopnea.      REVIEW OF SYMPTOMS:     CONSTITUTIONAL: No fever, no weight loss, no fatigue, no weight gain   ENMT:  No difficulty hearing, tinnitus, vertigo; No sinus or throat pain  NECK: No pain or stiffness  CARDIOVASCULAR: + chest pain, + dyspnea, no syncope, no palpitations, no dizziness, + Orthopnea, no Paroxsymal nocturnal dyspnea  RESPIRATORY: + Dyspnea on exertion, + Shortness of breath, no cough, no wheezing  : No dysuria, no hematuria   GI: No dark color stool, no melena, no diarrhea, no constipation, no abdominal pain   NEURO: No headache, no dizziness, no slurred speech   MUSCULOSKELETAL: No joint pain or swelling; No muscle, back, or extremity pain  PSYCH: No agitation, no anxiety.    ALL OTHER REVIEW OF SYSTEMS ARE NEGATIVE.      PREVIOUS DIAGNOSTIC TESTING  ECHO FINDINGS:  < from: LUCRECIA Echo Doppler (01.29.21 @ 10:53) >    Summary:   1.Left ventricular ejection fraction, by visual estimation, is 50 to 55%.   2. Normal right ventricular size and function.   3. Left atrial enlargement.   4. Thickening of the posterior mitral valve leaflet.   5. Mild mitral valve regurgitation by visual estimation, confirmed using PISA method.   6. Aortic valve is abnormally functioning bioprosthetic valve. There is severe aortic insufficiency of the bioprosthetic valve. The AI jet is central, with no evidence of paravalvular regurgitation.   7. Structurally normal tricuspid valve, with normal leaflet excursion.   8. Structurally normal pulmonic valve, with normal leaflet excursion.   9. Color flow doppler and intravenous injection of agitated saline demonstrates the presence of an intact intra atrial septum.  10. Complex atheroma seen in the aortic arch.  11. No prior studies available for comparison.  12. Results discussed with Dr. Wolfe. Patient to be referred to CT surgery for further intervention.    PÉREZ Degroot Electronicallysigned on 1/29/2021 at 1:20:29 PM      < end of copied text >    CATHETERIZATION FINDINGS:   sept 2018  mod to severe pulm HTN  LIMA to LAD patent       ALLERGIES: Allergies  No Known Allergies  Intolerances      PAST MEDICAL HISTORY  Chronic kidney disease, stage 3 to stage 5  Pulmonary HTN  Mitral valve regurgitation  HLD (hyperlipidemia)  CAD (coronary artery disease)        PAST SURGICAL HISTORY  H/O hernia repair  H/O aortic valve replacement      FAMILY HISTORY:      SOCIAL HISTORY:  Denies smoking/alcohol/drugs      CURRENT MEDICATIONS:  furosemide   Injectable 20 milliGRAM(s) IV Push once      HOME MEDICATIONS:      Vital Signs Last 24 Hrs  T(C): 35.9 (22 Feb 2021 11:13), Max: 36.6 (22 Feb 2021 09:42)  T(F): 96.7 (22 Feb 2021 11:13), Max: 97.9 (22 Feb 2021 09:42)  HR: 74 (22 Feb 2021 12:57) (74 - 97)  BP: 150/66 (22 Feb 2021 12:57) (132/63 - 152/64)  RR: 20 (22 Feb 2021 12:57) (20 - 26)  SpO2: 98% (22 Feb 2021 12:57) (86% - 98%)      PHYSICAL EXAM:  Constitutional: Comfortable . No acute distress.   HEENT: Atraumatic and normocephalic , neck is supple . no JVD. No carotid bruit. PEERL   CNS: A&Ox3. No focal deficits. EOMI.   Respiratory: crackles at bases, unlabored   Cardiovascular: S1S2 RRR. No murmur/rubs or gallop.  Gastrointestinal: Soft non-tender and non distended . +Bowel sounds. negative Amin's sign.  Extremities: + edema.   Psychiatric: Calm . no agitation.  Skin: No skin rash/ulcers visualized to face, hands or feet.      LABS:                        11.0   9.80  )-----------( 223      ( 22 Feb 2021 11:52 )             34.3     02-22    140  |  108<H>  |  29.0<H>  ----------------------------<  83  4.1   |  19.0<L>  |  1.57<H>    Ca    10.3<H>      22 Feb 2021 11:52  Mg     1.8     02-22    TPro  7.6  /  Alb  4.2  /  TBili  0.5  /  DBili  x   /  AST  21  /  ALT  8   /  AlkPhos  47  02-22    CARDIAC MARKERS ( 22 Feb 2021 11:52 )  x     / 0.02 ng/mL / x     / x     / x        ;p-BNP=Serum Pro-Brain Natriuretic Peptide: 2903 pg/mL (02-22 @ 11:52)    PT/INR - ( 22 Feb 2021 11:52 )   PT: 11.6 sec;   INR: 1.00 ratio    PTT - ( 22 Feb 2021 11:52 )  PTT:32.2 sec      INTERPRETATION OF TELEMETRY: SR  ECG: SR, inferior infarct, ICBBBB     RADIOLOGY & ADDITIONAL STUDIES:    X-ray:  reviewed by me.

## 2021-02-22 NOTE — H&P ADULT - NSHPPHYSICALEXAM_GEN_ALL_CORE
Vital Signs Last 24 Hrs  T(F): 96.7 (22 Feb 2021 11:13), Max: 97.9 (22 Feb 2021 09:42)  HR: 74 (22 Feb 2021 12:57) (74 - 97)  BP: 150/66 (22 Feb 2021 12:57) (132/63 - 152/64)  RR: 20 (22 Feb 2021 12:57) (20 - 26)  SpO2: 98% (22 Feb 2021 12:57) (86% - 98%)    Physical Exam:  Constitutional: alert and oriented, in no acute distress   Neck: Soft and supple  Respiratory: Bibasilar crackles to auscultation   Cardiovascular: Regular rate and rhythm, no murmurs, gallops, rubs  Gastrointestinal: Soft, non-tender to palpation, +bs  Vascular: 2+ peripheral pulses  Neurological: A/O x 3, no focal neurological deficits  Musculoskeletal: 5/5 strength b/l upper and lower extremities, no lower extremity edema bilaterally Vital Signs Last 24 Hrs  T(F): 96.7 (22 Feb 2021 11:13), Max: 97.9 (22 Feb 2021 09:42)  HR: 74 (22 Feb 2021 12:57) (74 - 97)  BP: 150/66 (22 Feb 2021 12:57) (132/63 - 152/64)  RR: 20 (22 Feb 2021 12:57) (20 - 26)  SpO2: 98% (22 Feb 2021 12:57) (86% - 98%)    Physical Exam:  Constitutional: alert and oriented, in no acute distress   Neck: Soft and supple  Respiratory: Bibasilar crackles to auscultation   Cardiovascular: Regular rate and rhythm, no murmurs, gallops, rubs  Gastrointestinal: Soft, non-tender to palpation, +bs  Vascular: 2+ peripheral pulses  Neurological: A/O x 3, no focal neurological deficits  Musculoskeletal: trace bilateral lower extremity edema  Skin: no rashes  Psych: answering questions appropriately Vital Signs Last 24 Hrs  T(F): 96.7 (22 Feb 2021 11:13), Max: 97.9 (22 Feb 2021 09:42)  HR: 74 (22 Feb 2021 12:57) (74 - 97)  BP: 150/66 (22 Feb 2021 12:57) (132/63 - 152/64)  RR: 20 (22 Feb 2021 12:57) (20 - 26)  SpO2: 98% (22 Feb 2021 12:57) (86% - 98%)    Physical Exam:  Constitutional: alert and oriented, in no acute distress   Neck: Soft and supple  Respiratory: Bibasilar crackles to auscultation   Cardiovascular: Regular rate and rhythm  Gastrointestinal: Soft, non-tender to palpation, +bs  Vascular: 2+ peripheral pulses  Neurological: A/O x 3, no focal neurological deficits  Musculoskeletal: trace bilateral lower extremity edema  Skin: no rashes  Psych: answering questions appropriately

## 2021-02-22 NOTE — ED ADULT TRIAGE NOTE - CHIEF COMPLAINT QUOTE
Patient reports SOB that started lasted night with episodes of dizziness when she stands up, uses home 02-3L,

## 2021-02-22 NOTE — ED PROVIDER NOTE - CARE PLAN
Principal Discharge DX:	SOB (shortness of breath)  Secondary Diagnosis:	CAD (coronary artery disease)  Secondary Diagnosis:	Chronic kidney disease, stage 3 to stage 5  Secondary Diagnosis:	Mitral valve regurgitation   Principal Discharge DX:	CHF (congestive heart failure)  Secondary Diagnosis:	CAD (coronary artery disease)  Secondary Diagnosis:	Chronic kidney disease, stage 3 to stage 5  Secondary Diagnosis:	Mitral valve regurgitation

## 2021-02-22 NOTE — ED PROVIDER NOTE - OBJECTIVE STATEMENT
This is a 89y female with PMHx of PAH and PSHx of aortic valve replacement in 2010 coming in for worsening SOB since last night that comes on and off. Patient states her shortness of breath was worse when lying down or with activity, but it is alleviated with rest and home oxygen at 3L. Patient admits to being constantly on home oxygen at 3 L. Patient also admits to chest discomfort that is dull. It is located at her chest and does not radiate any where else. She rates her pain 8/10. Patient did not note anything worsen or tried anything to alleviate her chest pressure. Patient admits fatigue, dizziness, orthopnea with a increased of 2 pillow, edema of her leg. Patient also mentioned that she was diagnosed with UTI 10 days ago treated with augmentin. She admits to finishing her antibiotic as prescribed. Patient denies fever, cough, being near sick people, weight gain, palpitations, and bleeding. 89y female with PMHx of PAH and PSHx of aortic valve replacement in 2010 coming in for worsening SOB since last night that comes on and off. Patient states her shortness of breath was worse when lying down or with activity, but it is alleviated with rest and home oxygen at 3L. Patient admits to being constantly on home oxygen at 3 L. Patient also admits to chest discomfort that is dull. It is located at her chest and does not radiate any where else. She rates her pain 8/10. Patient did not note anything worsen or tried anything to alleviate her chest pressure. Patient admits fatigue, dizziness, orthopnea with a increased of 2 pillow, edema of her leg. Patient also mentioned that she was diagnosed with UTI 10 days ago treated with augmentin. She admits to finishing her antibiotic as prescribed. Patient denies fever, cough, being near sick people, weight gain, palpitations, and bleeding.

## 2021-02-22 NOTE — ED PROVIDER NOTE - CLINICAL SUMMARY MEDICAL DECISION MAKING FREE TEXT BOX
This is a 89y female with PMHx of PAH and PSHx of aortic valve replacement in 2010 coming in for worsening SOB since last night that comes on and off. Patient states her shortness of breath was worse when lying down or with activity, but it is alleviated with rest and home oxygen at 3L. Patient admits to being constantly on home oxygen at 3 L. Patient also admits to chest discomfort that is dull. It is located at her chest and does not radiate any where else. She rates her pain 8/10. Patient did not note anything worsen or tried anything to alleviate her chest pressure. Patient admits fatigue, dizziness, orthopnea with a increased of 2 pillow, edema of her leg. Patient also mentioned that she was diagnosed with UTI 10 days ago treated with augmentin. She admits to finishing her antibiotic as prescribed.     Order Blood Lab work, CXR, EKG, BNP, UA to rule out possible UTI. 89y female with PMHx of PAH and PSHx of aortic valve replacement in 2010 coming in for worsening SOB since last night that comes on and off.     Order Blood Lab work, CXR, EKG, BNP, UA

## 2021-02-22 NOTE — H&P ADULT - NSICDXPASTMEDICALHX_GEN_ALL_CORE_FT
PAST MEDICAL HISTORY:  CAD (coronary artery disease)     Chronic kidney disease, stage 3 to stage 5     HLD (hyperlipidemia)     Mitral valve regurgitation     Pulmonary HTN

## 2021-02-23 DIAGNOSIS — I35.1 NONRHEUMATIC AORTIC (VALVE) INSUFFICIENCY: ICD-10-CM

## 2021-02-23 DIAGNOSIS — E78.5 HYPERLIPIDEMIA, UNSPECIFIED: ICD-10-CM

## 2021-02-23 DIAGNOSIS — N18.9 CHRONIC KIDNEY DISEASE, UNSPECIFIED: ICD-10-CM

## 2021-02-23 DIAGNOSIS — I27.20 PULMONARY HYPERTENSION, UNSPECIFIED: ICD-10-CM

## 2021-02-23 DIAGNOSIS — J90 PLEURAL EFFUSION, NOT ELSEWHERE CLASSIFIED: ICD-10-CM

## 2021-02-23 DIAGNOSIS — E03.9 HYPOTHYROIDISM, UNSPECIFIED: ICD-10-CM

## 2021-02-23 DIAGNOSIS — Z29.9 ENCOUNTER FOR PROPHYLACTIC MEASURES, UNSPECIFIED: ICD-10-CM

## 2021-02-23 DIAGNOSIS — I50.33 ACUTE ON CHRONIC DIASTOLIC (CONGESTIVE) HEART FAILURE: ICD-10-CM

## 2021-02-23 DIAGNOSIS — I34.0 NONRHEUMATIC MITRAL (VALVE) INSUFFICIENCY: ICD-10-CM

## 2021-02-23 DIAGNOSIS — I25.10 ATHEROSCLEROTIC HEART DISEASE OF NATIVE CORONARY ARTERY WITHOUT ANGINA PECTORIS: ICD-10-CM

## 2021-02-23 LAB
ANION GAP SERPL CALC-SCNC: 10 MMOL/L — SIGNIFICANT CHANGE UP (ref 5–17)
BLD GP AB SCN SERPL QL: SIGNIFICANT CHANGE UP
BUN SERPL-MCNC: 31 MG/DL — HIGH (ref 8–20)
CALCIUM SERPL-MCNC: 9.9 MG/DL — SIGNIFICANT CHANGE UP (ref 8.6–10.2)
CHLORIDE SERPL-SCNC: 107 MMOL/L — SIGNIFICANT CHANGE UP (ref 98–107)
CO2 SERPL-SCNC: 23 MMOL/L — SIGNIFICANT CHANGE UP (ref 22–29)
CREAT SERPL-MCNC: 1.68 MG/DL — HIGH (ref 0.5–1.3)
GLUCOSE SERPL-MCNC: 77 MG/DL — SIGNIFICANT CHANGE UP (ref 70–99)
HCT VFR BLD CALC: 32.1 % — LOW (ref 34.5–45)
HGB BLD-MCNC: 10.2 G/DL — LOW (ref 11.5–15.5)
MAGNESIUM SERPL-MCNC: 1.8 MG/DL — SIGNIFICANT CHANGE UP (ref 1.6–2.6)
MCHC RBC-ENTMCNC: 27.9 PG — SIGNIFICANT CHANGE UP (ref 27–34)
MCHC RBC-ENTMCNC: 31.8 GM/DL — LOW (ref 32–36)
MCV RBC AUTO: 87.7 FL — SIGNIFICANT CHANGE UP (ref 80–100)
PLATELET # BLD AUTO: 221 K/UL — SIGNIFICANT CHANGE UP (ref 150–400)
POTASSIUM SERPL-MCNC: 4.1 MMOL/L — SIGNIFICANT CHANGE UP (ref 3.5–5.3)
POTASSIUM SERPL-SCNC: 4.1 MMOL/L — SIGNIFICANT CHANGE UP (ref 3.5–5.3)
PREALB SERPL-MCNC: 22 MG/DL — SIGNIFICANT CHANGE UP (ref 18–38)
RBC # BLD: 3.66 M/UL — LOW (ref 3.8–5.2)
RBC # FLD: 15.7 % — HIGH (ref 10.3–14.5)
SARS-COV-2 IGG SERPL QL IA: NEGATIVE — SIGNIFICANT CHANGE UP
SARS-COV-2 IGM SERPL IA-ACNC: 0.07 INDEX — SIGNIFICANT CHANGE UP
SODIUM SERPL-SCNC: 140 MMOL/L — SIGNIFICANT CHANGE UP (ref 135–145)
TSH SERPL-MCNC: 1.58 UIU/ML — SIGNIFICANT CHANGE UP (ref 0.27–4.2)
WBC # BLD: 9.8 K/UL — SIGNIFICANT CHANGE UP (ref 3.8–10.5)
WBC # FLD AUTO: 9.8 K/UL — SIGNIFICANT CHANGE UP (ref 3.8–10.5)

## 2021-02-23 PROCEDURE — 99233 SBSQ HOSP IP/OBS HIGH 50: CPT

## 2021-02-23 PROCEDURE — 71275 CT ANGIOGRAPHY CHEST: CPT | Mod: 26

## 2021-02-23 PROCEDURE — 32557 INSERT CATH PLEURA W/ IMAGE: CPT

## 2021-02-23 PROCEDURE — 71045 X-RAY EXAM CHEST 1 VIEW: CPT | Mod: 26

## 2021-02-23 PROCEDURE — 74174 CTA ABD&PLVS W/CONTRAST: CPT | Mod: 26

## 2021-02-23 PROCEDURE — 93308 TTE F-UP OR LMTD: CPT | Mod: 26

## 2021-02-23 PROCEDURE — 99233 SBSQ HOSP IP/OBS HIGH 50: CPT | Mod: 25

## 2021-02-23 PROCEDURE — 99223 1ST HOSP IP/OBS HIGH 75: CPT

## 2021-02-23 RX ORDER — POTASSIUM CHLORIDE 20 MEQ
20 PACKET (EA) ORAL ONCE
Refills: 0 | Status: COMPLETED | OUTPATIENT
Start: 2021-02-23 | End: 2021-02-23

## 2021-02-23 RX ORDER — OXYCODONE AND ACETAMINOPHEN 5; 325 MG/1; MG/1
2 TABLET ORAL EVERY 4 HOURS
Refills: 0 | Status: DISCONTINUED | OUTPATIENT
Start: 2021-02-23 | End: 2021-02-25

## 2021-02-23 RX ORDER — SODIUM BICARBONATE 1 MEQ/ML
0.08 SYRINGE (ML) INTRAVENOUS
Qty: 75 | Refills: 0 | Status: DISCONTINUED | OUTPATIENT
Start: 2021-02-23 | End: 2021-02-24

## 2021-02-23 RX ORDER — MAGNESIUM SULFATE 500 MG/ML
2 VIAL (ML) INJECTION ONCE
Refills: 0 | Status: COMPLETED | OUTPATIENT
Start: 2021-02-23 | End: 2021-02-23

## 2021-02-23 RX ORDER — ALBUMIN HUMAN 25 %
250 VIAL (ML) INTRAVENOUS ONCE
Refills: 0 | Status: COMPLETED | OUTPATIENT
Start: 2021-02-23 | End: 2021-02-23

## 2021-02-23 RX ORDER — OXYCODONE AND ACETAMINOPHEN 5; 325 MG/1; MG/1
1 TABLET ORAL EVERY 4 HOURS
Refills: 0 | Status: DISCONTINUED | OUTPATIENT
Start: 2021-02-23 | End: 2021-02-25

## 2021-02-23 RX ADMIN — HEPARIN SODIUM 5000 UNIT(S): 5000 INJECTION INTRAVENOUS; SUBCUTANEOUS at 18:32

## 2021-02-23 RX ADMIN — Medication 12.5 MILLIGRAM(S): at 18:32

## 2021-02-23 RX ADMIN — SODIUM CHLORIDE 3 MILLILITER(S): 9 INJECTION INTRAMUSCULAR; INTRAVENOUS; SUBCUTANEOUS at 21:07

## 2021-02-23 RX ADMIN — Medication 75 MICROGRAM(S): at 06:19

## 2021-02-23 RX ADMIN — Medication 12.5 MILLIGRAM(S): at 06:19

## 2021-02-23 RX ADMIN — OXYCODONE AND ACETAMINOPHEN 1 TABLET(S): 5; 325 TABLET ORAL at 21:07

## 2021-02-23 RX ADMIN — Medication 125 MILLILITER(S): at 23:25

## 2021-02-23 RX ADMIN — Medication 20 MILLIGRAM(S): at 21:07

## 2021-02-23 RX ADMIN — HEPARIN SODIUM 5000 UNIT(S): 5000 INJECTION INTRAVENOUS; SUBCUTANEOUS at 06:19

## 2021-02-23 RX ADMIN — Medication 20 MILLIGRAM(S): at 06:19

## 2021-02-23 RX ADMIN — SODIUM CHLORIDE 3 MILLILITER(S): 9 INJECTION INTRAMUSCULAR; INTRAVENOUS; SUBCUTANEOUS at 06:13

## 2021-02-23 RX ADMIN — AMLODIPINE BESYLATE 10 MILLIGRAM(S): 2.5 TABLET ORAL at 06:19

## 2021-02-23 RX ADMIN — SODIUM CHLORIDE 3 MILLILITER(S): 9 INJECTION INTRAMUSCULAR; INTRAVENOUS; SUBCUTANEOUS at 14:54

## 2021-02-23 RX ADMIN — Medication 20 MILLIGRAM(S): at 18:33

## 2021-02-23 RX ADMIN — Medication 65 MEQ/KG/HR: at 11:40

## 2021-02-23 RX ADMIN — SIMVASTATIN 20 MILLIGRAM(S): 20 TABLET, FILM COATED ORAL at 21:07

## 2021-02-23 RX ADMIN — OXYCODONE AND ACETAMINOPHEN 2 TABLET(S): 5; 325 TABLET ORAL at 12:54

## 2021-02-23 RX ADMIN — Medication 20 MILLIEQUIVALENT(S): at 06:19

## 2021-02-23 RX ADMIN — Medication 50 GRAM(S): at 06:19

## 2021-02-23 NOTE — PROCEDURE NOTE - NSPROCDETAILS_GEN_ALL_CORE
Seldinger technique/secured in place/sterile dressing applied/ultrasound assessment of fluid (location)

## 2021-02-23 NOTE — CONSULT NOTE ADULT - SUBJECTIVE AND OBJECTIVE BOX
Patient is a 89y old  Female who presents with a chief complaint of Shortness of breath (23 Feb 2021 10:35)    HPI:  89 y/o F with PMH of HTN, anemia, COPD on home O2, HLD, CKD stage 3, severe mitral valve regurgitation, pHTN on Sildenafil, CAD with one vessel CABG (LIMA to LAD) with bio-prosthetic AVR (2010) presents with shortness of breath. The patient states in the past 2 days she has become more short of breath and last night was unable to sleep flat. She states that in the last day she was unable ot even walk a few feet to the bathroom without becoming short of breath and had to sleep on 3 pillows overnight.     The patient is in the process of being worked up for a TAVR. Recent LUCRECIA shows severe AI. The patient was scheduled to have a LHC but was unable to secondary to CHAIM with a creatinine of 2.8, Lasix was held at that time.    (22 Feb 2021 14:27)      PAST MEDICAL & SURGICAL HISTORY:    Chronic kidney disease, stage 3 to stage 5    Pulmonary HTN    Mitral valve regurgitation    HLD (hyperlipidemia)    CAD (coronary artery disease)    H/O hernia repair    H/O aortic valve replacement      ( 21mm Billy Nunez (2010)    FAMILY HISTORY: No ESRD    Social History: Non Smoker,    MEDICATIONS  (STANDING):    amLODIPine   Tablet 10 milliGRAM(s) Oral daily  heparin   Injectable 5000 Unit(s) SubCutaneous every 12 hours  levothyroxine 75 MICROGram(s) Oral daily  metoprolol tartrate 12.5 milliGRAM(s) Oral two times a day  sildenafil (REVATIO) 20 milliGRAM(s) Oral three times a day  simvastatin 20 milliGRAM(s) Oral at bedtime  sodium bicarbonate  Infusion 0.079 mEq/kG/Hr (65 mL/Hr) IV Continuous <Continuous>  sodium chloride 0.9% lock flush 3 milliLiter(s) IV Push every 8 hours    Allergies    No Known Allergies    REVIEW OF SYSTEMS:    CONSTITUTIONAL: No fever, weight loss, + fatigue  EYES: No eye pain, visual disturbances, or discharge  ENMT:  No difficulty hearing, tinnitus, vertigo; No sinus or throat pain  NECK: No pain or stiffness  BREASTS: No pain, masses, or nipple discharge  RESPIRATORY: No cough, wheezing, chills or hemoptysis; + shortness of breath  CARDIOVASCULAR: No chest pain, palpitations, dizziness, + leg swelling  GASTROINTESTINAL: No abdominal or epigastric pain. No nausea, vomiting, or hematemesis; No diarrhea or constipation. No melena or hematochezia.  GENITOURINARY: No dysuria, frequency, hematuria, or incontinence  NEUROLOGICAL: No headaches, memory loss, loss of strength, numbness, or tremors  SKIN: No itching, burning, rashes, or lesions   LYMPH NODES: No enlarged glands  ENDOCRINE: No heat or cold intolerance; No hair loss  MUSCULOSKELETAL: No joint pain or swelling; No muscle, back, or extremity pain  PSYCHIATRIC: No depression, anxiety, mood swings, or difficulty sleeping  HEME/LYMPH: No easy bruising, or bleeding gums  ALLERY AND IMMUNOLOGIC: No hives or eczema    Vital Signs Last 24 Hrs  T(C): 37.2 (23 Feb 2021 08:00), Max: 37.2 (23 Feb 2021 08:00)  T(F): 98.9 (23 Feb 2021 08:00), Max: 98.9 (23 Feb 2021 08:00)  HR: 68 (23 Feb 2021 07:00) (66 - 97)  BP: 116/53 (23 Feb 2021 07:00) (116/53 - 160/70)  BP(mean): 77 (23 Feb 2021 07:00) (77 - 100)  RR: 31 (23 Feb 2021 07:00) (20 - 43)  SpO2: 98% (23 Feb 2021 07:00) (92% - 99%)    PHYSICAL EXAM:    GENERAL: NAD, Pale, Frail,  HEAD:  Atraumatic, Normocephalic  EYES: EOMI, PERRLA, conjunctiva and sclera clear  ENMT: Moist mucous membranes, No lesions  NECK: Supple, + JVD,  NERVOUS SYSTEM:  Alert & Oriented X3, Good concentration;   CHEST/LUNG: Clear to percussion bilaterally; No rales, rhonchi, wheezing, or rubs  HEART: Regular rate and rhythm; 3/6 S/D murmur, No rubs, or gallops  ABDOMEN: Soft, Nontender, Nondistended; Bowel sounds present  EXTREMITIES:  2+ Peripheral Pulses, No clubbing, cyanosis, + edema  LYMPH: No lymphadenopathy noted  SKIN: No rashes or lesions      LABS:                        10.2   9.80  )-----------( 221      ( 23 Feb 2021 04:23 )             32.1     02-23    140  |  107  |  31.0<H>  ----------------------------<  77  4.1   |  23.0  |  1.68<H>    Ca    9.9      23 Feb 2021 04:23  Mg     1.8     02-23    TPro  7.6  /  Alb  4.2  /  TBili  0.5  /  DBili  x   /  AST  21  /  ALT  8   /  AlkPhos  47  02-22    PT/INR - ( 22 Feb 2021 11:52 )   PT: 11.6 sec;   INR: 1.00 ratio         PTT - ( 22 Feb 2021 11:52 )  PTT:32.2 sec    Magnesium, Serum: 1.8 mg/dL (02-23 @ 04:23)  Magnesium, Serum: 1.8 mg/dL (02-22 @ 11:52)    RADIOLOGY & ADDITIONAL TESTS:    Xray Chest 1 View-PORTABLE IMMEDIATE (02.22.21 @ 11:01)     EXAM:  XR CHEST PORTABLE IMMED 1V                          PROCEDURE DATE:  02/22/2021      INTERPRETATION:  CLINICAL STATEMENT: Chest pain.    TECHNIQUE: AP view of the chest.    COMPARISON: None available.    FINDINGS/IMPRESSION:    Sternotomy wires and heart valve present. Moderate size left pleural effusion. Mild increased interstitial lung markings.    Heart size cannot be accurately assessed in this projection, but appear enlarged.    RONALD RIVAS MD; Attending Radiologist  This document has been electronically signed. Feb 22 2021 11:13AM  Creatinine, Serum: 1.68 mg/dL (02.23.21 @ 04:23)   Historical Values  Creatinine, Serum: 1.68 mg/dL (02.23.21 @ 04:23)   Creatinine, Serum: 1.57 mg/dL (02.22.21 @ 11:52)   Creatinine, Serum: 2.34 mg/dL (01.29.21 @ 07:43)

## 2021-02-23 NOTE — PROGRESS NOTE ADULT - SUBJECTIVE AND OBJECTIVE BOX
ICU Vital Signs Last 24 Hrs,    T(C): 37.2 (23 Feb 2021 08:00), Max: 37.2 (23 Feb 2021 08:00)  T(F): 98.9 (23 Feb 2021 08:00), Max: 98.9 (23 Feb 2021 08:00)  HR: 68 (23 Feb 2021 07:00) (66 - 97)  BP: 116/53 (23 Feb 2021 07:00) (116/53 - 160/70)  BP(mean): 77 (23 Feb 2021 07:00) (77 - 100)  RR: 31 (23 Feb 2021 07:00) (20 - 43)  SpO2: 98% (23 Feb 2021 07:00) (92% - 99%)    140    |  107    |  31.0<H>  ----------------------------<  77     Ca:9.9   (23 Feb 2021 04:23)  4.1     |  23.0   |  1.68<H>    eGFR if Non : 26 <L>    TPro  7.6    /  Alb  4.2    /  TBili  0.5    /  DBili  x      /  AST  21     /  ALT  8      /  AlkPhos  47     22 Feb 2021 11:52                        10.2<L>  9.80  )-----------( 221      ( 23 Feb 2021 04:23 )             32.1<L>    B12:1.58 uIU /mL TSH:-- (02-23 @ 04:24)    PAST MEDICAL & SURGICAL HISTORY:    Chronic kidney disease, stage 3 to stage 5    Pulmonary HTN    Mild Mitral valve regurgitation    CAD (coronary artery disease)    H/O hernia repair    H/O aortic valve replacement  : 21mm Billy Nunez (2010)

## 2021-02-23 NOTE — PROGRESS NOTE ADULT - PROBLEM SELECTOR PROBLEM 2
Acute on chronic diastolic heart failure Acute on chronic diastolic congestive heart failure, NYHA class 4

## 2021-02-23 NOTE — PROGRESS NOTE ADULT - ASSESSMENT
89 y/o F with PMH of HTN, anemia, COPD on home O2, HLD, CKD stage 3, severe bioprosthetic aortic insufficiency, mitral valve regurgitation, pulm HTN on Sildenafil, CAD with one vessel CABG (LIMA to LAD) with bio-prosthetic AVR (2010) presents with shortness of breath, worsening over the last 2 days.

## 2021-02-23 NOTE — PROGRESS NOTE ADULT - SUBJECTIVE AND OBJECTIVE BOX
Brief Hospital Course:   2/22 p/w worsening SOB/HILLS, admitted with acute on chronic diastolic heart failure, severe AI, mild to mod MR, moderate to large left pleural effusion      Subjective: still SOB but less so now that not moving. Denies CP, palpitations, N/V, other c/o.  ROS negative x 10 systems except as noted above      Patient is a 89y old  Female who presents with a chief complaint of Shortness of breath (22 Feb 2021 16:47)    HPI:  89 y/o F with PMH of HTN, anemia, COPD on home O2, HLD, CKD stage 3, severe mitral valve regurgitation, pHTN on Sildenafil, CAD with one vessel CABG (LIMA to LAD) with bio-prosthetic AVR (2010) presents with shortness of breath. The patient states in the past 2 days she has become more short of breath and last night was unable to sleep flat. She states that in the last day she was unable ot even walk a few feet to the bathroom without becoming short of breath and had to sleep on 3 pillows overnight.     The patient is in the process of being worked up for a TAVR. Recent LUCRECIA shows severe AI. The patient was scheduled to have a LHC but was unable to secondary to CHAIM with a creatinine of 2.8, Lasix was held at that time.    (22 Feb 2021 14:27)    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  Pulmonary HTN  Mitral valve regurgitation  HLD (hyperlipidemia)  CAD (coronary artery disease)  H/O hernia repair  H/O aortic valve replacement  21mm Billy Nunez (2010)      Vitals   ICU Vital Signs Last 24 Hrs  T(C): 36.8 (23 Feb 2021 01:30), Max: 36.9 (22 Feb 2021 14:33)  T(F): 98.2 (23 Feb 2021 01:30), Max: 98.5 (22 Feb 2021 14:33)  HR: 80 (23 Feb 2021 01:30) (74 - 97), NSR  BP: 160/70 (23 Feb 2021 01:30) (117/65 - 160/70)  BP(mean): 100 (23 Feb 2021 01:30) (82 - 100)  RR: 29 (23 Feb 2021 01:30) (20 - 29)  SpO2: 95% (23 Feb 2021 01:30) (86% - 99%) on 3L NC      I&O's Detail    22 Feb 2021 07:01  -  23 Feb 2021 02:14  --------------------------------------------------------  IN:  Total IN: 0 mL    OUT:    Voided (mL): 900 mL  Total OUT: 900 mL    Total NET: -900 mL      LABS                        11.0   9.80  )-----------( 223      ( 22 Feb 2021 11:52 )             34.3     02-22    140  |  108<H>  |  29.0<H>  ----------------------------<  83  4.1   |  19.0<L>  |  1.57<H>    Ca    10.3<H>      22 Feb 2021 11:52  Mg     1.8     02-22  TPro  7.6  /  Alb  4.2  /  TBili  0.5  /  DBili  x   /  AST  21  /  ALT  8   /  AlkPhos  47  02-22    PT/INR - ( 22 Feb 2021 11:52 )   PT: 11.6 sec;   INR: 1.00 ratio    PTT - ( 22 Feb 2021 11:52 )  PTT:32.2 sec    Serum Pro-Brain Natriuretic Peptide (02.22.21 @ 11:52)    Serum Pro-Brain Natriuretic Peptide: 2903 pg/mL    Troponin T, Serum (02.22.21 @ 11:52)    Troponin T, Serum: 0.02 ng/mL      < from: Xray Chest 1 View-PORTABLE IMMEDIATE (02.22.21 @ 11:01) >  FINDINGS/IMPRESSION:  Sternotomy wires and heart valve present. Moderate size left pleural effusion. Mild increased interstitial lung markings.  Heart size cannot be accurately assessed in this projection, but appear enlarged.  < end of copied text >      < from: 12 Lead ECG (02.22.21 @ 11:10) >  Ventricular Rate 76 BPM  Atrial Rate 76 BPM  P-R Interval 248 ms  QRS Duration 104 ms  Q-T Interval 388 ms  QTC Calculation(Bazett) 436 ms  P Axis 44 degrees  R Axis -27 degrees  T Axis 64 degrees  Diagnosis Line Sinus rhythm [id number] degree A-V block  Left ventricular hypertrophy with repolarization abnormality  Abnormal ECG  < end of copied text >      < from: LUCRECIA Echo Doppler (01.29.21 @ 10:53) >  Summary:   1.Left ventricular ejection fraction, by visual estimation, is 50 to 55%.   2. Normal right ventricular size and function.   3. Left atrial enlargement.   4. Thickening of the posterior mitral valve leaflet.   5. Mild mitral valve regurgitation by visual estimation, confirmed using PISA method.   6. Aortic valve is abnormally functioning bioprosthetic valve. There is severe aortic insufficiency of the bioprosthetic valve. The AI jet is central, with no evidence of paravalvular regurgitation.   7. Structurally normal tricuspid valve, with normal leaflet excursion.   8. Structurally normal pulmonic valve, with normal leaflet excursion.   9. Color flow doppler and intravenous injection of agitated saline demonstrates the presence of an intact intra atrial septum.  10. Complex atheroma seen in the aortic arch.  11. No prior studies available for comparison.  12. Results discussed with Dr. Wolfe. Patient to be referred to CT surgery for further intervention.  < end of copied text >      MEDICATIONS  (STANDING):  amLODIPine   Tablet 10 milliGRAM(s) Oral daily  heparin   Injectable 5000 Unit(s) SubCutaneous every 12 hours  levothyroxine 75 MICROGram(s) Oral daily  metoprolol tartrate 12.5 milliGRAM(s) Oral two times a day  sildenafil (REVATIO) 20 milliGRAM(s) Oral three times a day  simvastatin 20 milliGRAM(s) Oral at bedtime  sodium chloride 0.9% lock flush 3 milliLiter(s) IV Push every 8 hours    No Known Allergies      Physical Exam:   Neuro: A+O x 3, non-focal, speech clear and intact  HEENT:  NCAT, PERRL, EOMI. No conjuctival edema or icterus, no thrush.  No ETT or NGT/OGT  Neck:  supple, trachea midline, no tracheostomy  Pulm: CTA except left base which is diminished, no rales/rhonchi/wheezing, no accessory muscle use noted  CV: regular rate, regular rhythm, +S1S2  Abd: soft, NT, ND, + BS  Ext: MARY x 4, 1+ BLE edema, no cyanosis or clubbing, distal motor/neuro/circ intact  Skin: warm, dry, well perfused    limited left chest POCUS: at least moderate sized left pleural effusion      Code Status: full code      Time spent: 37 minutes (includes patient assessment, examination, discharge planning, coordination of care, patient and family education and counseling

## 2021-02-23 NOTE — CONSULT NOTE ADULT - ASSESSMENT
Identifying patients at risk — At-risk patients include the following:    ?All patients with estimated glomerular filtration rate (eGFR) <60 mL/min/1.73 m2 who have significant proteinuria (defined as albuminuria >300 mg/day, which corresponds to proteinuria > 500 mg/day).    ?All patients with eGFR <60 mL/min/1.73 m2 and comorbidities including diabetes, heart failure, liver failure, or multiple myeloma.    ?All patients with eGFR <45 mL/min/1.73/m2 even in the absence of proteinuria or any other comorbidities.    ?Patients who have eGFR <45 mL/min/1.73 m2 and have proteinuria and diabetes or other comorbidities and all patients with eGFR <30 mL/min/1.73 m2 should be considered at highest risk.    Contrast-Induced CHAIM    Contrast-induced CHAIM (CI-CHAIM, also referred to as contrast-associated CHAIM) is a specific form of CHAIM that usually manifests as a transient small increase in Scr concentration within a few days of exposure to intravascular iodinated contrast. Despite its usually self-limited course, CI-CHAIM is associated with increased short- and long-term mortality, as well as progressive CKD. Recently, the degree to which radiocontrast affects the kidney has been debated because several studies (both meta-analyses and cohort studies) have suggested that in the aggregate population, the risk for CHAIM after contrast administration is perhaps overemphasized.    Nonetheless, in clinical practice, for any given study requiring iodinated contrast, the potential risks and benefits should be weighed closely. Along the same lines, patient- and procedure-level factors contribute to the risk for CI-CHAIM and should be assessed. The primary risk factor for CI-CHAIM is CKD, and the incidence of CI-CHAIM increases incrementally as GFR decreases or proteinuria/albuminuria increases. Diabetes further increases the risk in those with CKD. Additional patient-specific risk factors include low effective circulating blood volume and nonsteroidal anti-inflammatory drug use. Procedure-related risk factors include higher contrast volume, intra-arterial procedures, multiple contrast exposures in a short interval, and hyperosmolar contrast agents.    Management of CI-CHAIM aims primarily at prevention. Consideration should be given to alternative noncontrast studies if possible. Those who undergo iodinated contrast studies should have treatment with nonsteroidal anti-inflammatory drugs and other nephrotoxins discontinued, ideally at least 24 hours before the procedure. Low- or iso-osmolar radiocontrast should be used, at the lowest possible volume required. Isotonic intravenous fluid administration reduces the risk for CI-CHAIM and should be used in those at elevated risk. Typical regimens consist of a 1-mL/kg/h infusion 12 hours before and 12 hours after contrast exposure, or 3 mL/kg/h 1 hour before and 1.5 mL/kg/h for 4 to 6 hours postprocedure. With regard to fluid selection, although small studies suggested a benefit to the use of isotonic sodium bicarbonate solution, a large randomized clinical trial of isotonic bicarbonate versus normal saline solution (factorialized with N-acetylcysteine vs placebo) in high-risk patients undergoing angiography showed no benefit with bicarbonate or N-acetylcysteine with regard to a composite end point of death, RRT, and 50% reduction in GFR at 90 days. There have been a variety of other pharmacotherapies evaluated for CI-CHAIM prevention, none of which is clearly beneficial. Hemodialysis after administration of contrast is ineffective for preventing CI-CHAIM and may cause harm.

## 2021-02-23 NOTE — PROGRESS NOTE ADULT - ASSESSMENT
AS/AI ( Prosthetic Valve )    CKD - 4 ,    High Risk fof SONIA    For all at-risk patients, we use the following preventive measures:    •We recommend the use of iodixanol or nonionic low-osmolal agents, such as iopamidol or ioversol, rather than iohexol (Grade 1B). We do not use high-osmolal agents (1400 to 1800 mosmol/kg).    •We use lower doses of contrast and avoid repetitive, closely spaced studies (eg, <48 hours apart).     •Avoid volume depletion and nonsteroidal antiinflammatory drugs (NSAIDs).     •For patients at highest risk, defined as those who have eGFR <45 mL/min/1.73 m2, proteinuria, and diabetes or other comorbidities, and all patients with eGFR <30 mL/min/1.73 m2, in the absence of contraindications to volume expansion, we recommend intravenous fluids prior to and continued for several hours after contrast administration (Grade 1B). While no placebo-controlled studies have proven a benefit of prophylactic intravenous fluid in these risk groups, indirect data support its use.    In addition, for all at-risk patients in the absence of contraindications to volume expansion, we suggest intravenous fluids prior to and continued for several hours after contrast administration (Grade 2C). We recommend isotonic saline rather than bicarbonate to prevent CI-CHAIM (Grade 1B). Bicarbonate provides no additional benefit to saline, needs to be compounded, and is more expensive.     Timing and rate of administration are independent of fluid type and vary between inpatients and outpatients:    -Among outpatients, we give 3 mL/kg over one hour preprocedure and 1 to 1.5 mL/kg/hour during and for four to six hours postprocedure, with administration of at least 6 mL/kg postprocedure, regardless of fluid type.    -Among inpatients, we give 1 mL/kg/hour for 6 to 12 hours preprocedure, intra procedure, and for 6 to 12 hours postprocedure.     •For all at-risk patients, we suggest that acetylcysteine not be given either orally or intravenously (Grade 2B). Although meta-analyses have been inconsistent and marked by heterogeneity, a large randomized trial found similar outcomes in patients receiving acetylcysteine compared with placebo.     •We do not use mannitol or other diuretics prophylactically. Diuretics have not been shown to be effective in preventing CI-CHAIM.     However, diuretics may be used to treat volume overload if present.     •We do not perform prophylactic hemofiltration or hemodialysis after contrast exposure to prevent CI-CHAIM. (See 'Prophylactic hemofiltration and hemodialysis' above.)

## 2021-02-23 NOTE — PROGRESS NOTE ADULT - SUBJECTIVE AND OBJECTIVE BOX
[place] CARDIOLOGY-[facility]                                                               Office: [address] [address]                                                              Telephone: [phone]. Fax:[phone]                                                                             PROGRESS NOTE  Reason for follow up: Dyspnea, acute on chronic diastolic heart failure, severe AI, mild to mod MR, moderate to large left pleural effusion  Update: 2/23- Patient resting comfortably on bed s/p Cardiac CTa chest. Reports improvement in dyspnea s/p insertion of Left chest tube today for pleural effusion, draining straw fluid, comfortable on 4L NC. Cr stable 1.68.      Review of symptoms:   All review of systems negative unless indicated otherwise above.     	  Vitals:  T(C): 36.9 (02-23-21 @ 12:00), Max: 37.2 (02-23-21 @ 08:00)  HR: 76 (02-23-21 @ 13:00) (66 - 82)  BP: 130/58 (02-23-21 @ 13:00) (108/53 - 160/70)  RR: 25 (02-23-21 @ 13:00) (20 - 43)  SpO2: 97% (02-23-21 @ 13:00) (92% - 99%)  Wt(kg): --  I&O's Summary    22 Feb 2021 07:01  -  23 Feb 2021 07:00  --------------------------------------------------------  IN: 50 mL / OUT: 1700 mL / NET: -1650 mL    23 Feb 2021 07:01  -  23 Feb 2021 15:07  --------------------------------------------------------  IN: 195 mL / OUT: 150 mL / NET: 45 mL      Weight (kg): 61.7 (02-22 @ 09:42)      PHYSICAL EXAM:  Appearance: NAD, well nourished	  Neurologic: A&Ox3, PERRL, EOMI, Grossly non-focal with full strength in all four extremities  HEENT:   Normal oral mucosa, sclera non-icteric	  Lymphatic: No cervical lymphadenopathy  Cardiovascular: Normal S1 S2, No JVD, IV/VI murmur, No carotid bruits  Respiratory: mildly diminished LLL	  Psychiatry: Mood & affect appropriate  Gastrointestinal:  Soft, Non-tender, + BS, no bruits	  Extremities: Normal range of motion, No clubbing, cyanosis. Trace b/l LE edema  Vascular: Peripheral pulses palpable 2+ bilaterally  Skin: No Erythema, No ecchymoses, No cyanosis, No rashes  Lines and Tubes: n/a      CURRENT MEDICATIONS:  metoprolol tartrate 12.5 milliGRAM(s) Oral two times a day  sildenafil (REVATIO) 20 milliGRAM(s) Oral three times a day    levothyroxine  simvastatin  heparin   Injectable  sodium bicarbonate  Infusion  sodium chloride 0.9% lock flush      DIAGNOSTIC TESTING:  [ ] Echocardiogram:   < from: TTE Echo Limited or F/U (02.23.21 @ 09:36) >  Summary:   1. Technically adequate study.   2. Normal global left ventricular systolic function.   3. Left ventricular ejection fraction, by visual estimation, is 55 to 60%.   4. Spectral Doppler shows pseudonormal pattern of left ventricular myocardial filling (Grade II diastolic dysfunction).   5. Elevated mean left atrial pressure.   6. Severely enlarged left atrium.   7. Mildly enlarged right atrium.   8. Mild thickening and calcification of the anterior mitral valve leaflet.   9. Moderate to severe mitral valve regurgitation.  10. Moderate mitral annular calcification.  11. Aortic valve is abnormally functioning bioprosthetic valve.  12. Severe aortic regurgitation.  13. Mild tricuspid regurgitation.  14. Estimated pulmonary artery systolic pressure is 51.4 mmHg assuming a right atrial pressure of 3 mmHg, which is consistent with moderate pulmonary hypertension.  15. There is no evidence of pericardial effusion.    MD Milan Electronically signed on 2/23/2021 at 1:12:32 PM    < end of copied text >    < from: LUCRECIA Echo Doppler (01.29.21 @ 10:53) >  Summary:   1.Left ventricular ejection fraction, by visual estimation, is 50 to 55%.   2. Normal right ventricular size and function.   3. Left atrial enlargement.   4. Thickening of the posterior mitral valve leaflet.   5. Mild mitral valve regurgitation by visual estimation, confirmed using PISA method.   6. Aortic valve is abnormally functioning bioprosthetic valve. There is severe aortic insufficiency of the bioprosthetic valve. The AI jet is central, with no evidence of paravalvular regurgitation.   7. Structurally normal tricuspid valve, with normal leaflet excursion.   8. Structurally normal pulmonic valve, with normal leaflet excursion.   9. Color flow doppler and intravenous injection of agitated saline demonstrates the presence of an intact intra atrial septum.  10. Complex atheroma seen in the aortic arch.  11. No prior studies available for comparison.  12. Results discussed with Dr. Wolfe. Patient to be referred to CT surgery for further intervention.    PÉREZ Degroot Electronicallysigned on 1/29/2021 at 1:20:29 PM    < end of copied text >      [ ]  Catheterization:  [ ] Stress Test:    OTHER: 	  < from: CT Angio Chest w/ IV Cont (02.23.21 @ 13:59) >  IMPRESSION:    Aortic measurements as described.    Indeterminate bilateral renal lesions for which contrast enhanced MRI can be performed to further characterize.    < end of copied text >      LABS:	 	  CARDIAC MARKERS ( 22 Feb 2021 11:52 )  x     / 0.02 ng/mL / x     / x     / x      p-BNP 22 Feb 2021 11:52: 2903 pg/mL                          10.2   9.80  )-----------( 221      ( 23 Feb 2021 04:23 )             32.1     02-23    140  |  107  |  31.0<H>  ----------------------------<  77  4.1   |  23.0  |  1.68<H>    Ca    9.9      23 Feb 2021 04:23  Mg     1.8     02-23    TPro  7.6  /  Alb  4.2  /  TBili  0.5  /  DBili  x   /  AST  21  /  ALT  8   /  AlkPhos  47  02-22    proBNP: Serum Pro-Brain Natriuretic Peptide: 2903 pg/mL (02-22 @ 11:52)    Lipid Profile:   HgA1c:   TSH: Thyroid Stimulating Hormone, Serum: 1.58 uIU/mL        TELEMETRY: SR 60s	                                                                      [place] CARDIOLOGY-[facility]                                                               Office: [address] [address]                                                              Telephone: [phone]. Fax:[phone]                                                                             PROGRESS NOTE  Reason for follow up: Dyspnea, acute on chronic diastolic heart failure, severe AI, mild to mod MR, moderate to large left pleural effusion  Update: 2/23- Patient resting comfortably on bed s/p TAVR cardiac CTa chest. Reports improvement in dyspnea s/p insertion of Left chest tube today for pleural effusion, draining straw fluid, comfortable on 4L NC. Cr stable 1.68.      Review of symptoms:   All review of systems negative unless indicated otherwise above.     	  Vitals:  T(C): 36.9 (02-23-21 @ 12:00), Max: 37.2 (02-23-21 @ 08:00)  HR: 76 (02-23-21 @ 13:00) (66 - 82)  BP: 130/58 (02-23-21 @ 13:00) (108/53 - 160/70)  RR: 25 (02-23-21 @ 13:00) (20 - 43)  SpO2: 97% (02-23-21 @ 13:00) (92% - 99%)  Wt(kg): --  I&O's Summary    22 Feb 2021 07:01  -  23 Feb 2021 07:00  --------------------------------------------------------  IN: 50 mL / OUT: 1700 mL / NET: -1650 mL    23 Feb 2021 07:01  -  23 Feb 2021 15:07  --------------------------------------------------------  IN: 195 mL / OUT: 150 mL / NET: 45 mL      Weight (kg): 61.7 (02-22 @ 09:42)      PHYSICAL EXAM:  Appearance: NAD, well nourished	  Neurologic: A&Ox3, PERRL, EOMI, Grossly non-focal with full strength in all four extremities  HEENT:   Normal oral mucosa, sclera non-icteric	  Lymphatic: No cervical lymphadenopathy  Cardiovascular: Normal S1 S2, No JVD, IV/VI murmur, No carotid bruits  Respiratory: mildly diminished LLL	  Psychiatry: Mood & affect appropriate  Gastrointestinal:  Soft, Non-tender, + BS, no bruits	  Extremities: Normal range of motion, No clubbing, cyanosis. Trace b/l LE edema  Vascular: Peripheral pulses palpable 2+ bilaterally  Skin: No Erythema, No ecchymoses, No cyanosis, No rashes  Lines and Tubes: n/a      CURRENT MEDICATIONS:  metoprolol tartrate 12.5 milliGRAM(s) Oral two times a day  sildenafil (REVATIO) 20 milliGRAM(s) Oral three times a day    levothyroxine  simvastatin  heparin   Injectable  sodium bicarbonate  Infusion  sodium chloride 0.9% lock flush      DIAGNOSTIC TESTING:  [ ] Echocardiogram:   < from: TTE Echo Limited or F/U (02.23.21 @ 09:36) >  Summary:   1. Technically adequate study.   2. Normal global left ventricular systolic function.   3. Left ventricular ejection fraction, by visual estimation, is 55 to 60%.   4. Spectral Doppler shows pseudonormal pattern of left ventricular myocardial filling (Grade II diastolic dysfunction).   5. Elevated mean left atrial pressure.   6. Severely enlarged left atrium.   7. Mildly enlarged right atrium.   8. Mild thickening and calcification of the anterior mitral valve leaflet.   9. Moderate to severe mitral valve regurgitation.  10. Moderate mitral annular calcification.  11. Aortic valve is abnormally functioning bioprosthetic valve.  12. Severe aortic regurgitation.  13. Mild tricuspid regurgitation.  14. Estimated pulmonary artery systolic pressure is 51.4 mmHg assuming a right atrial pressure of 3 mmHg, which is consistent with moderate pulmonary hypertension.  15. There is no evidence of pericardial effusion.    MD Milan Electronically signed on 2/23/2021 at 1:12:32 PM    < end of copied text >    < from: LUCRECIA Echo Doppler (01.29.21 @ 10:53) >  Summary:   1.Left ventricular ejection fraction, by visual estimation, is 50 to 55%.   2. Normal right ventricular size and function.   3. Left atrial enlargement.   4. Thickening of the posterior mitral valve leaflet.   5. Mild mitral valve regurgitation by visual estimation, confirmed using PISA method.   6. Aortic valve is abnormally functioning bioprosthetic valve. There is severe aortic insufficiency of the bioprosthetic valve. The AI jet is central, with no evidence of paravalvular regurgitation.   7. Structurally normal tricuspid valve, with normal leaflet excursion.   8. Structurally normal pulmonic valve, with normal leaflet excursion.   9. Color flow doppler and intravenous injection of agitated saline demonstrates the presence of an intact intra atrial septum.  10. Complex atheroma seen in the aortic arch.  11. No prior studies available for comparison.  12. Results discussed with Dr. Wolfe. Patient to be referred to CT surgery for further intervention.    PÉREZ Degroot Electronicallysigned on 1/29/2021 at 1:20:29 PM    < end of copied text >      [ ]  Catheterization:  [ ] Stress Test:    OTHER: 	  < from: CT Angio Chest w/ IV Cont (02.23.21 @ 13:59) >  < from: CT Angio Chest w/ IV Cont (02.23.21 @ 13:59) >  FINDINGS:    Calcified aorta: moderate      AORTIC MEASUREMENTS: (mm)  Aortic annulus diameter (systole; mm): 18 x 21  Aortic annulus perimeter (systole; mm): 66  Distance of left coronary ostium to aortic annulus: 10  Distance of right coronary ostium to aortic annulus: 12  Sinus of Valsalva diameter (right coronary, diastole; mm): 27  Sinus of Valsalva diameter (left coronary, diastole; mm): 27  Sinus of Valsalva diameter (non coronary, diastole; mm): 25  Maximum ascending aorta diameter at 40 mm above annulus (diastole; mm): 39  Aortic root angulation (degrees): 49  Minimum abdominal aortic diameter: 10  Perpendicular abdominal aortic diameter at minimun: 13      Abdominal aortic aneurysm : No  Abdominal aortic AAA Stent : No    Thoracis aortic aneurysm : No  If yes,  maximum diameter (mm) :  Thoracic aortic TAA Stent : No    ACCESS VESSEL MEASUREMENTS:  Left Femoral:   Minimum Lumen Diameter (mm): 7      Diameter perpendicular to MLD (mm): 8      Tortuosity: No      Calcification: 90 to 180 degrees    Left External Iliac: Minimum Lumen Diameter (mm): 7       Diameter perpendicular to MLD (mm): 7      Tortuosity: moderate      Calcification:  Less than 90 degrees    Left Common Iliac:Minimum Lumen Diameter (mm): 8      Diameter perpendicular to MLD (mm): 10      Tortuosity: mild      Calcification: 90 to 180 degrees    Right Femoral: Minimum Lumen Diameter (mm): 7      Diameter perpendicular to MLD (mm): 8      Tortuosity: none      Calcification:  Less than 90 degrees    Right External Iliac:Minimum Lumen Diameter (mm): 6      Diameter perpendicular to MLD (mm): 8      Tortuosity: moderate      Calcification:  Less than 90 degrees    Right Common Iliac:   Minimum Lumen Diameter (mm): 8      Diameter perpendicular to MLD (mm): 9      Tortuosity: moderate      Calcification:  90 to 180 degrees      NON-VASCULAR FINDINGS:    Thyroid gland: unremarkable    Axillary lymph nodes: No significant.    Mediastinal lymph nodes: No significant.    Hilar Adenopathy: No significant.    Heart: Heart enlarged. Prosthetic aortic valve.    Coronary artery calcifications: Scattered calcifications    Pericardial effusion: No significant.    Lungs: Air-trapping. Patchy groundglass opacity. Left chest tube. Small left apical pneumothorax.        Liver: normal in size and configuration without focal mass.    Gallbladder: Gallstone.    Spleen:   normal in size.    Pancreas:   unremarkable.    Adrenal: No masses are seen.    Kidneys: No hydronephrosis. Multiple bilateral low-density lesions. High density 2.1 cm lesion on the right lower pole    No paraaortic or pelvic adenopathy.    Small bowel: No obstruction.    Colon: No wall thickening or pericolonic inflammatory changes.    Appendix:   within normal limits    Urinary bladder: unremarkable.    Gynecological structures are present.    Osseous structures: Sternotomy wires. Multilevel degenerative changes and scoliosis of the spine.    Asymmetric fat density in the right lateral inferior pelvis with soft tissue density extending into the right inguinal region. Please correlate clinically for hernia repair.        IMPRESSION:    Aortic measurements as described.    Indeterminate bilateral renal lesions for which contrast enhanced MRI can be performed to further characterize.    < end of copied text >      LABS:	 	  CARDIAC MARKERS ( 22 Feb 2021 11:52 )  x     / 0.02 ng/mL / x     / x     / x      p-BNP 22 Feb 2021 11:52: 2903 pg/mL                          10.2   9.80  )-----------( 221      ( 23 Feb 2021 04:23 )             32.1     02-23    140  |  107  |  31.0<H>  ----------------------------<  77  4.1   |  23.0  |  1.68<H>    Ca    9.9      23 Feb 2021 04:23  Mg     1.8     02-23    TPro  7.6  /  Alb  4.2  /  TBili  0.5  /  DBili  x   /  AST  21  /  ALT  8   /  AlkPhos  47  02-22    proBNP: Serum Pro-Brain Natriuretic Peptide: 2903 pg/mL (02-22 @ 11:52)    Lipid Profile:   HgA1c:   TSH: Thyroid Stimulating Hormone, Serum: 1.58 uIU/mL        TELEMETRY: SR 60s

## 2021-02-23 NOTE — PHYSICAL THERAPY INITIAL EVALUATION ADULT - ADDITIONAL COMMENTS
Pt. states she lives in a private home with 1 step to enter from the garage.  Uses home O2 as needed and assistive device cane or RW as needed for ambulation in the house.   Pt. was receiving home care and home PT prior to this admission.

## 2021-02-23 NOTE — PROGRESS NOTE ADULT - ASSESSMENT
89 year old female referred for consultation by Dr. Hernandes. She has a Past Medical History pertinent for CAD, CKD (Stage III), HTN, Anemia, COPD on O2 at home, CHF- diastolic , Pulmonary Hypertension and Severe Mitral Valve Regurgitation, bio AVE 2010 presents with SOB. Pt being worked up for AV. Recent LUCRECIA with severe AI. unable to have C 2/2 CHAIM, cr 2.8, lasix stopped at that time. Today presents to ED with worsening shortness of breath, dyspnea, orthopnea.    2/23- Patient resting comfortably on bed s/p Cardiac CTa chest. Reports improvement in dyspnea s/p insertion of Left chest tube today for pleural effusion, draining straw fluid, comfortable on 4L NC. Cr stable 1.68.    Dyspnea  -multifactorial hx COPD, diastolic HF, Aortic insufficiency, pulm HTN, left pleural effusion    Acute on Chronic Diastolic Heart Failure   -hypervolemic on exam  -Monitor on telemetry    -Strict I/O and daily weights  -Keep K > 4, Mg > 2    -Monitor renal function with ongoing diuresis     Aortic insufficiency of Bio aortic valve  -Aortic valve replacement 20 years ago  -pending SO TAVR  -CT Sx following, following with Dr Denny  -LUCRECIA as above  -CTA results pending    CAD  -[facility] 2018 LIMA to LAD patent   -cont home medications  -unable to complete ischemic work up CTA/cath last month 2/2 CHAIM    Pulm HTN  -cont sildenafil    CHAIM on CKD (stage III)  -neph following  -Cr 1.68  -c/t monitor  -avoid nephrotoxins    Assessment and recommendations are final when note is signed by the attending.            89 year old female referred for consultation by Dr. Hernandes. She has a Past Medical History pertinent for CAD, CKD (Stage III), HTN, Anemia, COPD on O2 at home, CHF- diastolic , Pulmonary Hypertension and Severe Mitral Valve Regurgitation, bio AVE 2010 presents with SOB. Pt being worked up for AV. Recent LUCRECIA with severe AI. unable to have [facility] 2/2 CHAIM, cr 2.8, lasix stopped at that time. Today presents to ED with worsening shortness of breath, dyspnea, orthopnea.    2/23- Patient resting comfortably on bed s/p TAVR cardiac CTa chest. Reports improvement in dyspnea s/p insertion of Left chest tube today for pleural effusion, draining straw fluid, comfortable on 4L NC. Cr stable 1.68.    Dyspnea  -multifactorial hx COPD, diastolic HF, Aortic insufficiency, pulm HTN, left pleural effusion    Acute on Chronic Diastolic Heart Failure   -hypervolemic on exam  -Monitor on telemetry    -Strict I/O and daily weights  -Keep K > 4, Mg > 2    -Monitor renal function with ongoing diuresis     Aortic insufficiency of Bio aortic valve  -Aortic valve replacement 20 years ago  -pending SO TAVR  -CT Sx following, following with Dr Denny  -LUCRECIA as above  -CTA results as above    CAD  -[facility] 2018 LIMA to LAD patent   -cont home medications  -?[facility] p/t SO TAVR    Pulm HTN  -cont sildenafil    CHAIM on CKD (stage III)  -neph following  -Cr 1.68  -c/t monitor  -avoid nephrotoxins    Assessment and recommendations are final when note is signed by the attending.

## 2021-02-24 ENCOUNTER — TRANSCRIPTION ENCOUNTER (OUTPATIENT)
Age: 86
End: 2021-02-24

## 2021-02-24 DIAGNOSIS — N18.9 CHRONIC KIDNEY DISEASE, UNSPECIFIED: ICD-10-CM

## 2021-02-24 DIAGNOSIS — E43 UNSPECIFIED SEVERE PROTEIN-CALORIE MALNUTRITION: ICD-10-CM

## 2021-02-24 LAB
ANION GAP SERPL CALC-SCNC: 10 MMOL/L — SIGNIFICANT CHANGE UP (ref 5–17)
APPEARANCE UR: CLEAR — SIGNIFICANT CHANGE UP
BACTERIA # UR AUTO: ABNORMAL
BILIRUB UR-MCNC: NEGATIVE — SIGNIFICANT CHANGE UP
BUN SERPL-MCNC: 36 MG/DL — HIGH (ref 8–20)
CALCIUM SERPL-MCNC: 9.8 MG/DL — SIGNIFICANT CHANGE UP (ref 8.6–10.2)
CHLORIDE SERPL-SCNC: 105 MMOL/L — SIGNIFICANT CHANGE UP (ref 98–107)
CO2 SERPL-SCNC: 27 MMOL/L — SIGNIFICANT CHANGE UP (ref 22–29)
COLOR SPEC: YELLOW — SIGNIFICANT CHANGE UP
CREAT SERPL-MCNC: 1.78 MG/DL — HIGH (ref 0.5–1.3)
DIFF PNL FLD: NEGATIVE — SIGNIFICANT CHANGE UP
EPI CELLS # UR: SIGNIFICANT CHANGE UP
GLUCOSE SERPL-MCNC: 96 MG/DL — SIGNIFICANT CHANGE UP (ref 70–99)
GLUCOSE UR QL: NEGATIVE MG/DL — SIGNIFICANT CHANGE UP
HCT VFR BLD CALC: 30.2 % — LOW (ref 34.5–45)
HGB BLD-MCNC: 9.6 G/DL — LOW (ref 11.5–15.5)
KETONES UR-MCNC: NEGATIVE — SIGNIFICANT CHANGE UP
LEUKOCYTE ESTERASE UR-ACNC: NEGATIVE — SIGNIFICANT CHANGE UP
MAGNESIUM SERPL-MCNC: 2.4 MG/DL — SIGNIFICANT CHANGE UP (ref 1.6–2.6)
MCHC RBC-ENTMCNC: 28.2 PG — SIGNIFICANT CHANGE UP (ref 27–34)
MCHC RBC-ENTMCNC: 31.8 GM/DL — LOW (ref 32–36)
MCV RBC AUTO: 88.8 FL — SIGNIFICANT CHANGE UP (ref 80–100)
MRSA PCR RESULT.: SIGNIFICANT CHANGE UP
NITRITE UR-MCNC: NEGATIVE — SIGNIFICANT CHANGE UP
OSMOLALITY UR: 549 MOSM/KG — SIGNIFICANT CHANGE UP (ref 300–1000)
PH UR: 5 — SIGNIFICANT CHANGE UP (ref 5–8)
PLATELET # BLD AUTO: 188 K/UL — SIGNIFICANT CHANGE UP (ref 150–400)
POTASSIUM SERPL-MCNC: 4.7 MMOL/L — SIGNIFICANT CHANGE UP (ref 3.5–5.3)
POTASSIUM SERPL-SCNC: 4.7 MMOL/L — SIGNIFICANT CHANGE UP (ref 3.5–5.3)
PROT UR-MCNC: 30 MG/DL
RBC # BLD: 3.4 M/UL — LOW (ref 3.8–5.2)
RBC # FLD: 15.9 % — HIGH (ref 10.3–14.5)
RBC CASTS # UR COMP ASSIST: SIGNIFICANT CHANGE UP /HPF (ref 0–4)
S AUREUS DNA NOSE QL NAA+PROBE: SIGNIFICANT CHANGE UP
SODIUM SERPL-SCNC: 142 MMOL/L — SIGNIFICANT CHANGE UP (ref 135–145)
SP GR SPEC: 1.01 — SIGNIFICANT CHANGE UP (ref 1.01–1.02)
UROBILINOGEN FLD QL: NEGATIVE MG/DL — SIGNIFICANT CHANGE UP
WBC # BLD: 10.08 K/UL — SIGNIFICANT CHANGE UP (ref 3.8–10.5)
WBC # FLD AUTO: 10.08 K/UL — SIGNIFICANT CHANGE UP (ref 3.8–10.5)
WBC UR QL: SIGNIFICANT CHANGE UP

## 2021-02-24 PROCEDURE — 99233 SBSQ HOSP IP/OBS HIGH 50: CPT

## 2021-02-24 PROCEDURE — 99223 1ST HOSP IP/OBS HIGH 75: CPT

## 2021-02-24 PROCEDURE — 99232 SBSQ HOSP IP/OBS MODERATE 35: CPT

## 2021-02-24 PROCEDURE — 71045 X-RAY EXAM CHEST 1 VIEW: CPT | Mod: 26,76

## 2021-02-24 RX ORDER — SENNA PLUS 8.6 MG/1
2 TABLET ORAL AT BEDTIME
Refills: 0 | Status: DISCONTINUED | OUTPATIENT
Start: 2021-02-24 | End: 2021-02-25

## 2021-02-24 RX ORDER — BENZOCAINE AND MENTHOL 5; 1 G/100ML; G/100ML
1 LIQUID ORAL
Refills: 0 | Status: DISCONTINUED | OUTPATIENT
Start: 2021-02-24 | End: 2021-02-25

## 2021-02-24 RX ORDER — PANTOPRAZOLE SODIUM 20 MG/1
40 TABLET, DELAYED RELEASE ORAL
Refills: 0 | Status: DISCONTINUED | OUTPATIENT
Start: 2021-02-24 | End: 2021-02-25

## 2021-02-24 RX ADMIN — HEPARIN SODIUM 5000 UNIT(S): 5000 INJECTION INTRAVENOUS; SUBCUTANEOUS at 05:16

## 2021-02-24 RX ADMIN — BENZOCAINE AND MENTHOL 1 LOZENGE: 5; 1 LIQUID ORAL at 20:50

## 2021-02-24 RX ADMIN — SODIUM CHLORIDE 3 MILLILITER(S): 9 INJECTION INTRAMUSCULAR; INTRAVENOUS; SUBCUTANEOUS at 13:30

## 2021-02-24 RX ADMIN — Medication 75 MICROGRAM(S): at 05:16

## 2021-02-24 RX ADMIN — Medication 20 MILLIGRAM(S): at 13:33

## 2021-02-24 RX ADMIN — PANTOPRAZOLE SODIUM 40 MILLIGRAM(S): 20 TABLET, DELAYED RELEASE ORAL at 05:16

## 2021-02-24 RX ADMIN — HEPARIN SODIUM 5000 UNIT(S): 5000 INJECTION INTRAVENOUS; SUBCUTANEOUS at 18:20

## 2021-02-24 RX ADMIN — Medication 20 MILLIGRAM(S): at 20:49

## 2021-02-24 RX ADMIN — SODIUM CHLORIDE 3 MILLILITER(S): 9 INJECTION INTRAMUSCULAR; INTRAVENOUS; SUBCUTANEOUS at 05:16

## 2021-02-24 RX ADMIN — Medication 12.5 MILLIGRAM(S): at 18:20

## 2021-02-24 RX ADMIN — Medication 12.5 MILLIGRAM(S): at 05:16

## 2021-02-24 RX ADMIN — SIMVASTATIN 20 MILLIGRAM(S): 20 TABLET, FILM COATED ORAL at 20:49

## 2021-02-24 RX ADMIN — SENNA PLUS 2 TABLET(S): 8.6 TABLET ORAL at 20:49

## 2021-02-24 RX ADMIN — Medication 20 MILLIGRAM(S): at 05:20

## 2021-02-24 RX ADMIN — SODIUM CHLORIDE 3 MILLILITER(S): 9 INJECTION INTRAMUSCULAR; INTRAVENOUS; SUBCUTANEOUS at 20:49

## 2021-02-24 NOTE — PROGRESS NOTE ADULT - SUBJECTIVE AND OBJECTIVE BOX
Brief summary:  89 y/o F with PMH of HTN, anemia, COPD on home O2, HLD, CKD stage 3, severe bioprosthetic aortic insufficiency, mitral valve regurgitation, pulm HTN on Sildenafil, CAD with one vessel CABG (LIMA to LAD) with bio-prosthetic AVR (2010) presents with shortness of breath, worsening over the last 2 days. Patient is undergoing work up for valve in valve TAVR.    Overnight events:  Patient states that she feels much better than when she was admitted to the hospital.  Patient denies acute pain with radiating or aggravating factors.  She denies chest pain, shortness of breath, palpitations, headache, dizziness, nausea, or vomiting.     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    Pulmonary HTN    Mitral valve regurgitation    HLD (hyperlipidemia)    CAD (coronary artery disease)    H/O hernia repair    H/O aortic valve replacement  21mm Billy Nunez (2010)      Medications:  heparin   Injectable 5000 Unit(s) SubCutaneous every 12 hours  levothyroxine 75 MICROGram(s) Oral daily  metoprolol tartrate 12.5 milliGRAM(s) Oral two times a day  oxycodone    5 mG/acetaminophen 325 mG 2 Tablet(s) Oral every 4 hours PRN  oxycodone    5 mG/acetaminophen 325 mG 1 Tablet(s) Oral every 4 hours PRN  pantoprazole    Tablet 40 milliGRAM(s) Oral before breakfast  senna 2 Tablet(s) Oral at bedtime  sildenafil (REVATIO) 20 milliGRAM(s) Oral three times a day  simvastatin 20 milliGRAM(s) Oral at bedtime  sodium bicarbonate  Infusion 0.079 mEq/kG/Hr IV Continuous <Continuous>  sodium chloride 0.9% lock flush 3 milliLiter(s) IV Push every 8 hours    MEDICATIONS  (PRN):  oxycodone    5 mG/acetaminophen 325 mG 2 Tablet(s) Oral every 4 hours PRN Severe Pain (7 - 10)  oxycodone    5 mG/acetaminophen 325 mG 1 Tablet(s) Oral every 4 hours PRN Moderate Pain (4 - 6)                        10.2   9.80  )-----------( 221      ( 23 Feb 2021 04:23 )             32.1   02-23    140  |  107  |  31.0<H>  ----------------------------<  77  4.1   |  23.0  |  1.68<H>    Ca    9.9      23 Feb 2021 04:23  Mg     1.8     02-23    TPro  7.6  /  Alb  4.2  /  TBili  0.5  /  DBili  x   /  AST  21  /  ALT  8   /  AlkPhos  47  02-22    CARDIAC MARKERS ( 22 Feb 2021 11:52 )  x     / 0.02 ng/mL / x     / x     / x        PT/INR - ( 22 Feb 2021 11:52 )   PT: 11.6 sec;   INR: 1.00 ratio    PTT - ( 22 Feb 2021 11:52 )  PTT:32.2 sec    Objective:  T(C): 36.6 (02-23-21 @ 23:00), Max: 37.2 (02-23-21 @ 08:00)  HR: 60 (02-24-21 @ 02:00) (60 - 82)  BP: 109/50 (02-24-21 @ 02:00) (108/50 - 142/63)  RR: 29 (02-24-21 @ 02:00) (25 - 43)  SpO2: 94% (02-24-21 @ 02:00) (91% - 99%)    I&O's Summary    22 Feb 2021 07:01  -  23 Feb 2021 07:00  --------------------------------------------------------  IN: 50 mL / OUT: 1700 mL / NET: -1650 mL    23 Feb 2021 07:01  -  24 Feb 2021 03:02  --------------------------------------------------------  IN: 955 mL / OUT: 420 mL / NET: 535 mL    Physical Exam  General: Well appearing, NAD  Neuro: AxO x3, non-focal, MARY  Cardiac: S1S2, systolic ejection murmur  Pulm: CTA b/l, no wheezing or rales  Abdomen: Soft, NT, ND, normoactive BS  Peripheral: +DP pulses b/l, no peripheral edema     Imaging:  < from: CT Angio Chest w/ IV Cont (02.23.21 @ 13:59) >  IMPRESSION:    Aortic measurements as described.    Indeterminate bilateral renal lesions for which contrast enhanced MRI can be performed to further characterize.    < end of copied text >

## 2021-02-24 NOTE — PROGRESS NOTE ADULT - SUBJECTIVE AND OBJECTIVE BOX
[place] CARDIOLOGY-[facility]                                                               Office: [address] [address]                                                              Telephone: [phone]. Fax:[phone]                                                                             PROGRESS NOTE  Reason for follow up: SOB   Overnight: No new events.   Update:     Subjective: No new events    	  Vitals:  T(C): 36.9 (02-24-21 @ 09:30), Max: 37 (02-23-21 @ 16:12)  HR: 71 (02-24-21 @ 09:30) (59 - 76)  BP: 133/55 (02-24-21 @ 09:30) (99/48 - 133/55)  RR: 20 (02-24-21 @ 09:30) (18 - 41)  SpO2: 98% (02-24-21 @ 09:30) (91% - 99%)    I&O's Summary    23 Feb 2021 07:01  -  24 Feb 2021 07:00  --------------------------------------------------------  IN: 1075 mL / OUT: 670 mL / NET: 405 mL    24 Feb 2021 07:01  -  24 Feb 2021 11:54  --------------------------------------------------------  IN: 0 mL / OUT: 50 mL / NET: -50 mL      Weight (kg): 61.7 (02-22 @ 09:42)      PHYSICAL EXAM:  Appearance: Comfortable. No acute distress  HEENT:  Head and neck: Atraumatic. Normocephalic.  Normal oral mucosa, PERRL, Neck is supple. No JVD, No carotid bruit.   Neurologic: A & O x 3, no focal deficits. EOMI  Lymphatic: No cervical lymphadenopathy  Cardiovascular: Normal S1 S2, No murmur, rubs/gallops. No JVD, No edema  Respiratory: +left chest tube yielding serousanguinous fluid,  LLL pleural rub  Gastrointestinal:  Soft, Non-tender, + BS  Lower Extremities: No edema  Psychiatry: Patient is calm. No agitation. Mood & affect appropriate  Skin: No rashes/ ecchymoses/cyanosis/ulcers visualized on the face, hands or feet       CURRENT MEDICATIONS:  metoprolol tartrate 12.5 milliGRAM(s) Oral two times a day  sildenafil (REVATIO) 20 milliGRAM(s) Oral three times a day  pantoprazole    Tablet  senna  levothyroxine  simvastatin  heparin   Injectable  sodium chloride 0.9% lock flush      DIAGNOSTIC TESTING:  [ ] Echocardiogram: < from: TTE Echo Limited or F/U (02.23.21 @ 09:36) >  Summary:   1. Technically adequate study.   2. Normal global left ventricular systolic function.   3. Left ventricular ejection fraction, by visual estimation, is 55 to 60%.   4. Spectral Doppler shows pseudonormal pattern of left ventricular myocardial filling (Grade II diastolic dysfunction).   5. Elevated mean left atrial pressure.   6. Severely enlarged left atrium.   7. Mildly enlarged right atrium.   8. Mild thickening and calcification of the anterior mitral valve leaflet.   9. Moderate to severe mitral valve regurgitation.  10. Moderate mitral annular calcification.  11. Aortic valve is abnormally functioning bioprosthetic valve.  12. Severe aortic regurgitation.  13. Mild tricuspid regurgitation.  14. Estimated pulmonary artery systolic pressure is 51.4 mmHg assuming a right atrial pressure of 3 mmHg, which is consistent with moderate pulmonary hypertension.  15. There is no evidence of pericardial effusion.      OTHER: 	    Xray:< from: Xray Chest 1 View- PORTABLE-Urgent (Xray Chest 1 View- PORTABLE-Urgent .) (02.23.21 @ 09:43) >  IMPRESSION:   LEFT multi-sidehole pigtail catheter overlies LEFT lower hemithorax diminished LEFT effusion.Blunting LEFT costophrenic angle. No pneumothorax.      XT: < from: CT Angio Chest w/ IV Cont (02.23.21 @ 13:59) >  IMPRESSION:    Aortic measurements as described.    Indeterminate bilateral renal lesions for which contrast enhanced MRI can be performed to further characterize.      LABS:	 	  CARDIAC MARKERS ( 22 Feb 2021 11:52 )  x     / 0.02 ng/mL / x     / x     / x      p-BNP 22 Feb 2021 11:52: 2903 pg/mL                          9.6    10.08 )-----------( 188      ( 24 Feb 2021 03:19 )             30.2     02-24    142  |  105  |  36.0<H>  ----------------------------<  96  4.7   |  27.0  |  1.78<H>    Ca    9.8      24 Feb 2021 03:19  Mg     2.4     02-24      proBNP: Serum Pro-Brain Natriuretic Peptide: 2903 pg/mL (02-22 @ 11:52)      TSH: Thyroid Stimulating Hormone, Serum: 1.58 uIU/mL        TELEMETRY:  NSR HR @ 60-70s    	                                                                      [place] CARDIOLOGY-[facility]                                                               Office: [address] [facility], [place] [address]                                                              Telephone: [phone]. Fax:[phone]                                                                             PROGRESS NOTE  Reason for follow up: SOB   Overnight: No new events.   Update: 2/24- Pt denies chest pain, palpitations, SOB. Tele- NSR HR @ 60-70s. Pt has +chest tube in left lung that will be DC'd when minimal output noted.  Pt dispo plan to be discharged to home with plan for outpt SO TAVR.    Subjective: No new events    	  Vitals:  T(C): 36.9 (02-24-21 @ 09:30), Max: 37 (02-23-21 @ 16:12)  HR: 71 (02-24-21 @ 09:30) (59 - 76)  BP: 133/55 (02-24-21 @ 09:30) (99/48 - 133/55)  RR: 20 (02-24-21 @ 09:30) (18 - 41)  SpO2: 98% (02-24-21 @ 09:30) (91% - 99%)    I&O's Summary    23 Feb 2021 07:01  -  24 Feb 2021 07:00  --------------------------------------------------------  IN: 1075 mL / OUT: 670 mL / NET: 405 mL    24 Feb 2021 07:01  -  24 Feb 2021 11:54  --------------------------------------------------------  IN: 0 mL / OUT: 50 mL / NET: -50 mL      Weight (kg): 61.7 (02-22 @ 09:42)      PHYSICAL EXAM:  Appearance: Comfortable. No acute distress  HEENT:  Head and neck: Atraumatic. Normocephalic.  Normal oral mucosa, PERRL, Neck is supple. No JVD, No carotid bruit.   Neurologic: A & O x 3, no focal deficits. EOMI  Lymphatic: No cervical lymphadenopathy  Cardiovascular: Normal S1 S2, No murmur, rubs/gallops. No JVD, No edema  Respiratory: +left chest tube yielding serousanguinous fluid,  LLL pleural rub  Gastrointestinal:  Soft, Non-tender, + BS  Lower Extremities: No edema  Psychiatry: Patient is calm. No agitation. Mood & affect appropriate  Skin: No rashes/ ecchymoses/cyanosis/ulcers visualized on the face, hands or feet       CURRENT MEDICATIONS:  metoprolol tartrate 12.5 milliGRAM(s) Oral two times a day  sildenafil (REVATIO) 20 milliGRAM(s) Oral three times a day  pantoprazole    Tablet  senna  levothyroxine  simvastatin  heparin   Injectable  sodium chloride 0.9% lock flush      DIAGNOSTIC TESTING:  [ ] Echocardiogram: < from: TTE Echo Limited or F/U (02.23.21 @ 09:36) >  Summary:   1. Technically adequate study.   2. Normal global left ventricular systolic function.   3. Left ventricular ejection fraction, by visual estimation, is 55 to 60%.   4. Spectral Doppler shows pseudonormal pattern of left ventricular myocardial filling (Grade II diastolic dysfunction).   5. Elevated mean left atrial pressure.   6. Severely enlarged left atrium.   7. Mildly enlarged right atrium.   8. Mild thickening and calcification of the anterior mitral valve leaflet.   9. Moderate to severe mitral valve regurgitation.  10. Moderate mitral annular calcification.  11. Aortic valve is abnormally functioning bioprosthetic valve.  12. Severe aortic regurgitation.  13. Mild tricuspid regurgitation.  14. Estimated pulmonary artery systolic pressure is 51.4 mmHg assuming a right atrial pressure of 3 mmHg, which is consistent with moderate pulmonary hypertension.  15. There is no evidence of pericardial effusion.      OTHER: 	    Xray:< from: Xray Chest 1 View- PORTABLE-Urgent (Xray Chest 1 View- PORTABLE-Urgent .) (02.23.21 @ 09:43) >  IMPRESSION:   LEFT multi-sidehole pigtail catheter overlies LEFT lower hemithorax diminished LEFT effusion.Blunting LEFT costophrenic angle. No pneumothorax.      XT: < from: CT Angio Chest w/ IV Cont (02.23.21 @ 13:59) >  IMPRESSION:    Aortic measurements as described.    Indeterminate bilateral renal lesions for which contrast enhanced MRI can be performed to further characterize.      LABS:	 	  CARDIAC MARKERS ( 22 Feb 2021 11:52 )  x     / 0.02 ng/mL / x     / x     / x      p-BNP 22 Feb 2021 11:52: 2903 pg/mL                          9.6    10.08 )-----------( 188      ( 24 Feb 2021 03:19 )             30.2     02-24    142  |  105  |  36.0<H>  ----------------------------<  96  4.7   |  27.0  |  1.78<H>    Ca    9.8      24 Feb 2021 03:19  Mg     2.4     02-24      proBNP: Serum Pro-Brain Natriuretic Peptide: 2903 pg/mL (02-22 @ 11:52)      TSH: Thyroid Stimulating Hormone, Serum: 1.58 uIU/mL        TELEMETRY:  NSR HR @ 60-70s

## 2021-02-24 NOTE — DISCHARGE NOTE PROVIDER - NSDCFUADDAPPT_GEN_ALL_CORE_FT
Please follow up with Dr. Denny on at [facility] for your scheduled TAVR procedure on 3/6/21       Please follow up with Dr. Denny on at [facility] for your scheduled TAVR procedure on 3/5/21.    Follow up with Dr Rosenberg early next week.

## 2021-02-24 NOTE — DISCHARGE NOTE PROVIDER - NSDCFUADDINST_GEN_ALL_CORE_FT
Please call the Cardiothoracic Surgery office at [phone] if you are experiencing any shortness of breath, chest pain, fevers or chills, drainage from the incisions, persistent nausea, vomiting or if you have any questions about your medications. If the symptoms are severe, call 911 and go to the nearest hospital.

## 2021-02-24 NOTE — DIETITIAN INITIAL EVALUATION ADULT. - OTHER INFO
Pt is a 89 y/o F with PMH of HTN, anemia, COPD on home O2, HLD, CKD stage 3, severe bioprosthetic aortic insufficiency, mitral valve regurgitation, pulm HTN on Sildenafil, CAD with one vessel CABG (LIMA to LAD) with bio-prosthetic AVR (2010) presents with shortness of breath, worsening over the last 2 days. Patient is undergoing work up for valve in valve TAVR.  Pt reports having persistent lack of appetite over past year secondary to pain. Pt endorses 45# wt loss in this time. Pt reports some difficulty swallowing secondary to a past intubation and needs to eat very small bites and has to eat slowly. Pt reports taking metamucil daily and drinks Chocolate Ensure at home. Pt agreeable to receiving/drinking supplements. Food preferences obtained. NFPE conducted.

## 2021-02-24 NOTE — DISCHARGE NOTE PROVIDER - PROVIDER TOKENS
PROVIDER:[TOKEN:[[id number]],FOLLOWUP:[1 week]],PROVIDER:[TOKEN:[[id number]],FOLLOWUP:[1 week]]

## 2021-02-24 NOTE — DIETITIAN INITIAL EVALUATION ADULT. - PERTINENT LABORATORY DATA
02-24 Na142 mmol/L Glu 96 mg/dL K+ 4.7 mmol/L Cr  1.78 mg/dL<H> BUN 36.0 mg/dL<H> Phos n/a   Alb n/a   PAB n/a     n/a  HgbA1C

## 2021-02-24 NOTE — PROGRESS NOTE ADULT - ASSESSMENT
89 y/o F with PMH of HTN, anemia, COPD on home O2, HLD, CKD stage 3, severe bioprosthetic aortic insufficiency, mitral valve regurgitation, pulm HTN on Sildenafil, CAD with one vessel CABG (LIMA to LAD) with bio-prosthetic AVR (2010) presents with acute on chronic diastolic heart failure with left pleural effusion. She is known to CT Surgery service for outpatient evaluation for valve in valve TAVR.

## 2021-02-24 NOTE — DISCHARGE NOTE PROVIDER - INSTRUCTIONS
Choose lean meats and poultry without skin and prepare them without added saturated and trans fat.  Eat fish at least twice a week. Recent research shows that eating oily fish containing omega-3 fatty acids (for example, salmon, trout and herring) may help lower your risk of death from coronary artery disease.  Select fat-free, 1 percent fat and low-fat dairy products.  Cut back on foods containing partially hydrogenated vegetable oils to reduce trans fat in your diet.   To lower cholesterol, reduce saturated fat to no more than 5 to 6 percent of total calories. For someone eating 2,000 calories a day, that’s about 13 grams of saturated fat.  Cut back on beverages and foods with added sugars.  Choose and prepare foods with little or no salt.

## 2021-02-24 NOTE — DISCHARGE NOTE PROVIDER - DETAILS OF MALNUTRITION DIAGNOSIS/DIAGNOSES
This patient has been assessed with a concern for Malnutrition and was treated during this hospitalization for the following Nutrition diagnosis/diagnoses:     -  02/24/2021: Severe protein-calorie malnutrition   This patient has been assessed with a concern for Malnutrition and was treated during this hospitalization for the following Nutrition diagnosis/diagnoses:     -  02/24/2021: Severe protein-calorie malnutrition    This patient has been assessed with a concern for Malnutrition and was treated during this hospitalization for the following Nutrition diagnosis/diagnoses:     -  02/24/2021: Severe protein-calorie malnutrition   This patient has been assessed with a concern for Malnutrition and was treated during this hospitalization for the following Nutrition diagnosis/diagnoses:     -  02/24/2021: Severe protein-calorie malnutrition    This patient has been assessed with a concern for Malnutrition and was treated during this hospitalization for the following Nutrition diagnosis/diagnoses:     -  02/24/2021: Severe protein-calorie malnutrition    This patient has been assessed with a concern for Malnutrition and was treated during this hospitalization for the following Nutrition diagnosis/diagnoses:     -  02/24/2021: Severe protein-calorie malnutrition

## 2021-02-24 NOTE — DIETITIAN INITIAL EVALUATION ADULT. - ETIOLOGY
Related to inadequate protein energy intake with persistent lack of appetite in setting of advanced age, hx of COPD and Severe aortic insufficiency.

## 2021-02-24 NOTE — DISCHARGE NOTE PROVIDER - NSDCACTIVITY_GEN_ALL_CORE
No restrictions No restrictions/Do not drive or operate machinery/Showering allowed/Stairs allowed/Walking - Indoors allowed/No heavy lifting/straining/Walking - Outdoors allowed

## 2021-02-24 NOTE — PROGRESS NOTE ADULT - ASSESSMENT
89 year old female referred for consultation by Dr. Hernandes. She has a Past Medical History pertinent for CAD, CKD (Stage III), HTN, Anemia, COPD on O2 at home, CHF- diastolic , Pulmonary Hypertension and Severe Mitral Valve Regurgitation, bio AVE 2010 presents with SOB. Pt being worked up for AV. Recent LUCRECIA with severe AI. unable to have [facility] 2/2 CHAIM, cr 2.8, lasix stopped at that time. Today presents to ED with worsening shortness of breath, dyspnea, orthopnea.    2/24- Pt denies chest pain, palpitations, SOB. Tele- NSR HR @ 60-70s. Pt has +chest tube in left lung that will be DC'd when minimal output noted.  Pt dispo plan to be discharged              Dyspnea  -multifactorial hx COPD, diastolic HF, Aortic insufficiency, pulm HTN, left pleural effusion  -Pt has +chest tube in left lung that will be DC'd when minimal output noted    Acute on Chronic Diastolic Heart Failure   -pleural rub on exam  -Monitor on telemetry    -Strict I/O and daily weights  -Keep K > 4, Mg > 2    -Monitor renal function with ongoing diuresis     Aortic insufficiency of Bio aortic valve  -Aortic valve replacement 20 years ago  -Pending SO TAVR  -CT Sx following, following with Dr Denny  -LUCRECIA as above  -CTA results as above    CAD  -[facility] 2018 LIMA to LAD patent   -cont home medications  -?[facility] p/t SO TAVR    Pulm HTN  -cont sildenafil    CHAIM on CKD (stage III)  -neph following  -Cr 1.78  -c/t monitor  -avoid nephrotoxins    Assessment and recommendations are final when note is signed by the attending.            89 year old female referred for consultation by Dr. Hernandes. She has a Past Medical History pertinent for CAD, CKD (Stage III), HTN, Anemia, COPD on O2 at home, CHF- diastolic , Pulmonary Hypertension and Severe Mitral Valve Regurgitation, bio AVE 2010 presents with SOB. Pt being worked up for AV. Recent LUCRECIA with severe AI. unable to have [facility] 2/2 CHAIM, cr 2.8, lasix stopped at that time. Today presents to ED with worsening shortness of breath, dyspnea, orthopnea.    2/24- Pt denies chest pain, palpitations, SOB. Tele- NSR HR @ 60-70s. Pt has +chest tube in left lung that will be DC'd when minimal output noted.  Pt dispo plan to be discharged to home with plan for outpt SO TAVR.               Dyspnea  -multifactorial hx COPD, diastolic HF, Aortic insufficiency, pulm HTN, left pleural effusion  -Pt has +chest tube in left lung that will be DC'd when minimal output noted    Acute on Chronic Diastolic Heart Failure   -pleural rub on exam  -Monitor on telemetry    -Strict I/O and daily weights  -Keep K > 4, Mg > 2    -Monitor renal function with ongoing diuresis     Aortic insufficiency of Bio aortic valve  -Aortic valve replacement 20 years ago  -Pending SO TAVR  -CT Sx following, following with Dr Denny  -LUCRECIA as above  -CTA results as above    CAD  -[facility] 2018 LIMA to LAD patent   -cont home medications    Pulm HTN  -cont sildenafil    CHAIM on CKD (stage III)  -neph following  -Cr 1.78  -c/t monitor  -avoid nephrotoxins    Assessment and recommendations are final when note is signed by the attending.

## 2021-02-24 NOTE — CHART NOTE - NSCHARTNOTEFT_GEN_A_CORE
89 y/o F with PMH of HTN, anemia, COPD on home O2, HLD, CKD stage 3, severe bioprosthetic aortic insufficiency, mitral valve regurgitation, pulm HTN on Sildenafil, CAD with one vessel CABG (LIMA to LAD) with bio-prosthetic AVR (2010) presents with acute on chronic diastolic heart failure with left pleural effusion. She is known to CT Surgery service for outpatient evaluation for valve in valve TAVR.     Pt transferred from ICU.  No issues.  Pigtail d/c.  Plan for discharge home tomorrow and plan for TAVR on 3/5

## 2021-02-24 NOTE — DIETITIAN NUTRITION RISK NOTIFICATION - TREATMENT: THE FOLLOWING DIET HAS BEEN RECOMMENDED
Diet, NPO after Midnight:      NPO Start Date: 22-Feb-2021,   NPO Start Time: 23:59 (02-22-21 @ 18:09) [Active]  Diet, DASH/TLC:   Sodium & Cholesterol Restricted (02-22-21 @ 15:34) [Active]

## 2021-02-24 NOTE — DISCHARGE NOTE PROVIDER - NSDCMRMEDTOKEN_GEN_ALL_CORE_FT
amLODIPine 10 mg oral tablet: 1 tab(s) orally once a day  levothyroxine 75 mcg (0.075 mg) oral tablet: 1 tab(s) orally once a day  metoprolol succinate 25 mg oral tablet, extended release: 1 tab(s) orally once a day  sildenafil 20 mg oral tablet: 1 tab(s) orally 3 times a day  simvastatin 20 mg oral tablet: 1 tab(s) orally once a day (at bedtime)   levothyroxine 75 mcg (0.075 mg) oral tablet: 1 tab(s) orally once a day  metoprolol succinate 25 mg oral tablet, extended release: 1 tab(s) orally once a day  senna oral tablet: 2 tab(s) orally once a day (at bedtime), As Needed  sildenafil 20 mg oral tablet: 1 tab(s) orally 3 times a day  simvastatin 20 mg oral tablet: 1 tab(s) orally once a day (at bedtime)  torsemide 10 mg oral tablet: 1 tab(s) orally once a day

## 2021-02-24 NOTE — DISCHARGE NOTE PROVIDER - NSDCFUSCHEDAPPT_GEN_ALL_CORE_FT
ZARA LOPEZ ; 02/27/2021 ; [facility] [address] [facility]  ZARA LOPEZ ; 03/05/2021 ; [facility]ES[facility] [address]

## 2021-02-24 NOTE — DISCHARGE NOTE PROVIDER - CARE PROVIDERS DIRECT ADDRESSES
,DirectAddress_Unknown,[email or web address]

## 2021-02-24 NOTE — DISCHARGE NOTE PROVIDER - HOSPITAL COURSE
89 y/o F with PMH of HTN, anemia, COPD on home O2, HLD, CKD stage 3, severe bioprosthetic aortic insufficiency, mitral valve regurgitation, pulm HTN on Sildenafil, CAD with one vessel CABG (LIMA to LAD) with bio-prosthetic AVR (2010) presents with acute on chronic diastolic heart failure with left pleural effusion. She is known to CT Surgery service for outpatient evaluation for valve in valve TAVR. Pt now with complete TAVR workup this admission. Plan for Discharge home 2/25 with scheduled TAVR date 3/5/21 with Dr. Shah.

## 2021-02-24 NOTE — DISCHARGE NOTE PROVIDER - NSDCCPCAREPLAN_GEN_ALL_CORE_FT
PRINCIPAL DISCHARGE DIAGNOSIS  Diagnosis: Severe aortic insufficiency  Assessment and Plan of Treatment: Severe aortic insufficiency  during your hospital stay you completed your TAVR workup and now will be discharged home with a scheduled surgery date 3/6/21 for a TAVR procedure. Please continue to take all medications as prescribed, and Please call the Cardiothoracic Surgery office at [phone] if you are experiencing any shortness of breath, chest pain, fevers or chills, persistent nausea, vomiting or if you have any questions about your medications. If the symptoms are severe, call 911 and go to the nearest hospital.        SECONDARY DISCHARGE DIAGNOSES  Diagnosis: Acute on chronic diastolic congestive heart failure, NYHA class 4  Assessment and Plan of Treatment: Acute on chronic diastolic congestive heart failure, NYHA class 4  Please return for your schedules TAVR procedure on 3/6/21 with Dr. Denny      Diagnosis: Mitral valve regurgitation  Assessment and Plan of Treatment: Take all medications as prescribed. Follow up with your primary care / cardiologist with in 2 weeks post TAVR procedure       Diagnosis: CAD (coronary artery disease)  Assessment and Plan of Treatment: continue to take all you medications as perscribed and follow-up with your cardiologist with 2- 4 weeks    Diagnosis: Severe protein-energy malnutrition  Assessment and Plan of Treatment: During your admission you where noted to have severe protein - energy Malnutrition. Please continue to drink Ensure Enlive three times a day and continue to take multivitamins as prescribed.    Diagnosis: Prophylactic measure  Assessment and Plan of Treatment: Prophylactic measure  Efforts to Avoid COVID-19 Infection  When your body is in a weakened state, such as after major heart or lung surgery, you could be more susceptible to infection and those infections could quickly become severe. Don’t be frightened but do stay home.  1. Take precaution  Currently, they advise at-risk populations to: avoid large crowds, stock up on supplies (especially medications), wash hands often (for a minimum of 20 seconds), stay home as much as possible and practice social distancing (six feet is recommended) when outside the home.  2. Use technology and this includes the phone  Applications like [employer], [employer], texting and even an old-fashioned phone call can keep those at-risk connected to family, friends and replace in-person participation with real-time interaction at family gatherings or social functions, until it is safe to resume in-person activity. It also keeps those at-risk connected to care providers. It’s important to remember that social distancing may be recommended but social isolation is not.  3. Be Knowledgeable  Know the emergency warning signs for COVID-19. If you notice any of the following symptoms: fevers, difficulty breathing or shortness of breath; persistent chest pain or pressure or new confusion – make sure you seek immediate medical attention.  4. Stay Up-to-Date  Pay attention to the latest up-to-date news regarding the situation and what is happening in the community so you can respond accordingly.  Your recovery is as dependent on continuing to move and be active as it is to avoid infection.   We encourage you to go outside and take brief walks, but while we are in a state of emergency and COVID-19 is on the rise, please limit contact with the public.       PRINCIPAL DISCHARGE DIAGNOSIS  Diagnosis: Severe aortic insufficiency  Assessment and Plan of Treatment: Severe aortic insufficiency  during your hospital stay you completed your TAVR workup and now will be discharged home with a scheduled surgery date 3/6/21 for a TAVR procedure. Please continue to take all medications as prescribed, and Please call the Cardiothoracic Surgery office at [phone] if you are experiencing any shortness of breath, chest pain, fevers or chills, persistent nausea, vomiting or if you have any questions about your medications. If the symptoms are severe, call 911 and go to the nearest hospital.        SECONDARY DISCHARGE DIAGNOSES  Diagnosis: Severe protein-energy malnutrition  Assessment and Plan of Treatment: During your admission you where noted to have severe protein - energy Malnutrition. Please continue to drink Ensure Enlive three times a day and take multivitamins daily.    Diagnosis: Prophylactic measure  Assessment and Plan of Treatment: Prophylactic measure  Efforts to Avoid COVID-19 Infection  When your body is in a weakened state, such as after major heart or lung surgery, you could be more susceptible to infection and those infections could quickly become severe. Don’t be frightened but do stay home.  1. Take precaution  Currently, they advise at-risk populations to: avoid large crowds, stock up on supplies (especially medications), wash hands often (for a minimum of 20 seconds), stay home as much as possible and practice social distancing (six feet is recommended) when outside the home.  2. Use technology and this includes the phone  Applications like [employer], [employer], texting and even an old-fashioned phone call can keep those at-risk connected to family, friends and replace in-person participation with real-time interaction at family gatherings or social functions, until it is safe to resume in-person activity. It also keeps those at-risk connected to care providers. It’s important to remember that social distancing may be recommended but social isolation is not.  3. Be Knowledgeable  Know the emergency warning signs for COVID-19. If you notice any of the following symptoms: fevers, difficulty breathing or shortness of breath; persistent chest pain or pressure or new confusion – make sure you seek immediate medical attention.  4. Stay Up-to-Date  Pay attention to the latest up-to-date news regarding the situation and what is happening in the community so you can respond accordingly.  Your recovery is as dependent on continuing to move and be active as it is to avoid infection.   We encourage you to go outside and take brief walks, but while we are in a state of emergency and COVID-19 is on the rise, please limit contact with the public.      Diagnosis: Acute on chronic diastolic congestive heart failure, NYHA class 4  Assessment and Plan of Treatment: Acute on chronic diastolic congestive heart failure, NYHA class 4  Please return for your schedules TAVR procedure on 3/6/21 with Dr. Denny      Diagnosis: Mitral valve regurgitation  Assessment and Plan of Treatment: Take all medications as prescribed. Follow up with your primary care / cardiologist with in 2 weeks post TAVR procedure       Diagnosis: CAD (coronary artery disease)  Assessment and Plan of Treatment: continue to take all you medications as perscribed and follow-up with your cardiologist with 2- 4 weeks

## 2021-02-24 NOTE — DISCHARGE NOTE PROVIDER - CARE PROVIDER_API CALL
Stalin Denny)  Surgery; Thoracic and Cardiac Surgery  [address] [place] [address]  Phone: [phone]  Fax: [phone]  Follow Up Time: 1 week    Denny Rosenberg)  Internal Medicine; Nephrology  [address] [place] [address]  Phone: [phone]  Fax: [phone]  Follow Up Time: 1 week

## 2021-02-24 NOTE — DIETITIAN NUTRITION RISK NOTIFICATION - MALNUTRITION EVALUATION AS DEMONSTRATED BY (ADULTS > 20 YEARS OF AGE)
Weight loss.../Inadequate energy intake.../Loss of subcutaneous fat.../Loss of muscle.../Fluid accumulation...

## 2021-02-25 ENCOUNTER — TRANSCRIPTION ENCOUNTER (OUTPATIENT)
Age: 86
End: 2021-02-25

## 2021-02-25 VITALS
OXYGEN SATURATION: 98 % | DIASTOLIC BLOOD PRESSURE: 62 MMHG | TEMPERATURE: 99 F | HEART RATE: 76 BPM | RESPIRATION RATE: 18 BRPM | SYSTOLIC BLOOD PRESSURE: 127 MMHG

## 2021-02-25 LAB
ANION GAP SERPL CALC-SCNC: 10 MMOL/L — SIGNIFICANT CHANGE UP (ref 5–17)
ANION GAP SERPL CALC-SCNC: 10 MMOL/L — SIGNIFICANT CHANGE UP (ref 5–17)
BUN SERPL-MCNC: 42 MG/DL — HIGH (ref 8–20)
BUN SERPL-MCNC: 43 MG/DL — HIGH (ref 8–20)
CALCIUM SERPL-MCNC: 9.7 MG/DL — SIGNIFICANT CHANGE UP (ref 8.6–10.2)
CALCIUM SERPL-MCNC: 9.7 MG/DL — SIGNIFICANT CHANGE UP (ref 8.6–10.2)
CHLORIDE SERPL-SCNC: 103 MMOL/L — SIGNIFICANT CHANGE UP (ref 98–107)
CHLORIDE SERPL-SCNC: 105 MMOL/L — SIGNIFICANT CHANGE UP (ref 98–107)
CO2 SERPL-SCNC: 21 MMOL/L — LOW (ref 22–29)
CO2 SERPL-SCNC: 23 MMOL/L — SIGNIFICANT CHANGE UP (ref 22–29)
CREAT SERPL-MCNC: 2.07 MG/DL — HIGH (ref 0.5–1.3)
CREAT SERPL-MCNC: 2.07 MG/DL — HIGH (ref 0.5–1.3)
GLUCOSE SERPL-MCNC: 84 MG/DL — SIGNIFICANT CHANGE UP (ref 70–99)
GLUCOSE SERPL-MCNC: 94 MG/DL — SIGNIFICANT CHANGE UP (ref 70–99)
HCT VFR BLD CALC: 30.5 % — LOW (ref 34.5–45)
HGB BLD-MCNC: 9.6 G/DL — LOW (ref 11.5–15.5)
MAGNESIUM SERPL-MCNC: 2.1 MG/DL — SIGNIFICANT CHANGE UP (ref 1.8–2.6)
MCHC RBC-ENTMCNC: 27.9 PG — SIGNIFICANT CHANGE UP (ref 27–34)
MCHC RBC-ENTMCNC: 31.5 GM/DL — LOW (ref 32–36)
MCV RBC AUTO: 88.7 FL — SIGNIFICANT CHANGE UP (ref 80–100)
PLATELET # BLD AUTO: 185 K/UL — SIGNIFICANT CHANGE UP (ref 150–400)
POTASSIUM SERPL-MCNC: 4.6 MMOL/L — SIGNIFICANT CHANGE UP (ref 3.5–5.3)
POTASSIUM SERPL-MCNC: 4.8 MMOL/L — SIGNIFICANT CHANGE UP (ref 3.5–5.3)
POTASSIUM SERPL-SCNC: 4.6 MMOL/L — SIGNIFICANT CHANGE UP (ref 3.5–5.3)
POTASSIUM SERPL-SCNC: 4.8 MMOL/L — SIGNIFICANT CHANGE UP (ref 3.5–5.3)
RBC # BLD: 3.44 M/UL — LOW (ref 3.8–5.2)
RBC # FLD: 16 % — HIGH (ref 10.3–14.5)
SODIUM SERPL-SCNC: 134 MMOL/L — LOW (ref 135–145)
SODIUM SERPL-SCNC: 138 MMOL/L — SIGNIFICANT CHANGE UP (ref 135–145)
WBC # BLD: 9.17 K/UL — SIGNIFICANT CHANGE UP (ref 3.8–10.5)
WBC # FLD AUTO: 9.17 K/UL — SIGNIFICANT CHANGE UP (ref 3.8–10.5)

## 2021-02-25 PROCEDURE — 71045 X-RAY EXAM CHEST 1 VIEW: CPT | Mod: 26

## 2021-02-25 PROCEDURE — 99233 SBSQ HOSP IP/OBS HIGH 50: CPT

## 2021-02-25 PROCEDURE — 99238 HOSP IP/OBS DSCHRG MGMT 30/<: CPT

## 2021-02-25 RX ORDER — SENNA PLUS 8.6 MG/1
2 TABLET ORAL
Qty: 0 | Refills: 0 | DISCHARGE
Start: 2021-02-25

## 2021-02-25 RX ORDER — SENNA PLUS 8.6 MG/1
2 TABLET ORAL
Qty: 60 | Refills: 0
Start: 2021-02-25 | End: 2021-03-26

## 2021-02-25 RX ADMIN — Medication 20 MILLIGRAM(S): at 13:19

## 2021-02-25 RX ADMIN — PANTOPRAZOLE SODIUM 40 MILLIGRAM(S): 20 TABLET, DELAYED RELEASE ORAL at 05:20

## 2021-02-25 RX ADMIN — HEPARIN SODIUM 5000 UNIT(S): 5000 INJECTION INTRAVENOUS; SUBCUTANEOUS at 11:09

## 2021-02-25 RX ADMIN — Medication 12.5 MILLIGRAM(S): at 05:20

## 2021-02-25 RX ADMIN — SODIUM CHLORIDE 3 MILLILITER(S): 9 INJECTION INTRAMUSCULAR; INTRAVENOUS; SUBCUTANEOUS at 13:06

## 2021-02-25 RX ADMIN — Medication 1 TABLET(S): at 11:09

## 2021-02-25 RX ADMIN — Medication 75 MICROGRAM(S): at 05:19

## 2021-02-25 RX ADMIN — SODIUM CHLORIDE 3 MILLILITER(S): 9 INJECTION INTRAMUSCULAR; INTRAVENOUS; SUBCUTANEOUS at 05:19

## 2021-02-25 RX ADMIN — Medication 20 MILLIGRAM(S): at 05:20

## 2021-02-25 RX ADMIN — BENZOCAINE AND MENTHOL 1 LOZENGE: 5; 1 LIQUID ORAL at 05:20

## 2021-02-25 NOTE — PROGRESS NOTE ADULT - PROBLEM SELECTOR PLAN 7
continue sildenafil  continue supplemental O2 as at home

## 2021-02-25 NOTE — PROGRESS NOTE ADULT - SUBJECTIVE AND OBJECTIVE BOX
SUBJECTIVE:  Pt in bed sleeping but easily arousable, pt states, "I am doing ok"  Patient denies acute pain with radiating or aggravating factors.  She denies chest pain, shortness of breath, palpitations, headache, dizziness, nausea, or vomiting.      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    Pulmonary HTN    Mitral valve regurgitation    HLD (hyperlipidemia)    CAD (coronary artery disease)    H/O hernia repair    H/O aortic valve replacement  21mm Billy Nunez (2010)        MEDICATIONS  benzocaine 15 mG/menthol 3.6 mG (Sugar-Free) Lozenge 1 Lozenge Oral every 3 hours PRN  heparin   Injectable 5000 Unit(s) SubCutaneous every 12 hours  levothyroxine 75 MICROGram(s) Oral daily  metoprolol tartrate 12.5 milliGRAM(s) Oral two times a day  multivitamin 1 Tablet(s) Oral daily  oxycodone    5 mG/acetaminophen 325 mG 2 Tablet(s) Oral every 4 hours PRN  oxycodone    5 mG/acetaminophen 325 mG 1 Tablet(s) Oral every 4 hours PRN  pantoprazole    Tablet 40 milliGRAM(s) Oral before breakfast  senna 2 Tablet(s) Oral at bedtime  sildenafil (REVATIO) 20 milliGRAM(s) Oral three times a day  simvastatin 20 milliGRAM(s) Oral at bedtime  sodium chloride 0.9% lock flush 3 milliLiter(s) IV Push every 8 hours  MEDICATIONS  (PRN):  benzocaine 15 mG/menthol 3.6 mG (Sugar-Free) Lozenge 1 Lozenge Oral every 3 hours PRN Sore Throat  oxycodone    5 mG/acetaminophen 325 mG 2 Tablet(s) Oral every 4 hours PRN Severe Pain (7 - 10)  oxycodone    5 mG/acetaminophen 325 mG 1 Tablet(s) Oral every 4 hours PRN Moderate Pain (4 - 6)      Daily                           9.6    10.08 )-----------( 188      ( 24 Feb 2021 03:19 )             30.2   02-24    142  |  105  |  36.0<H>  ----------------------------<  96  4.7   |  27.0  |  1.78<H>    Ca    9.8      24 Feb 2021 03:19  Mg     2.4     02-24      Objective:  T(C): 36.7 (02-24-21 @ 20:43), Max: 36.9 (02-24-21 @ 09:30)  HR: 79 (02-24-21 @ 20:43) (59 - 80)  BP: 133/72 (02-24-21 @ 20:43) (99/48 - 138/53)  RR: 18 (02-24-21 @ 20:43) (18 - 31)  SpO2: 96% (02-24-21 @ 20:43) (92% - 98%)  Wt(kg): --CAPILLARY BLOOD GLUCOSE      I&O's Summary    23 Feb 2021 07:01  -  24 Feb 2021 07:00  --------------------------------------------------------  IN: 1075 mL / OUT: 670 mL / NET: 405 mL    24 Feb 2021 07:01  -  25 Feb 2021 02:20  --------------------------------------------------------  IN: 260 mL / OUT: 50 mL / NET: 210 mL        Physical Exam  General: Well appearing, NAD  Neuro: AxO x3, non-focal, MARY  Cardiac: S1S2, systolic ejection murmur  Pulm: CTA b/l, no wheezing or rales  Abdomen: Soft, NT, ND, normoactive BS  Peripheral: +DP pulses b/l, no peripheral edema  Incision: Previous chest tube site c/d/i GENE    Imaging:  CXR:  pending read post left pigtail removal, CXR appears stable     ECG:  < from: 12 Lead ECG (02.22.21 @ 11:10) >    Ventricular Rate 76 BPM    Atrial Rate 76 BPM    P-R Interval 248 ms    QRS Duration 104 ms    Q-T Interval 388 ms    QTC Calculation(Bazett) 436 ms    P Axis 44 degrees    R Axis -27 degrees    T Axis 64 degrees    Diagnosis Line Sinus rhythm [id number] degree A-V block  Left ventricular hypertrophy with repolarization abnormality  Abnormal ECG    < end of copied text >        < from: CT Angio Chest w/ IV Cont (02.23.21 @ 13:59) >     EXAM:  CT ANGIO ABD PELV (W)AW IC                         EXAM:  CT ANGIO CHEST (W)AW IC                          PROCEDURE DATE:  02/23/2021          INTERPRETATION:  HISTORY: Aortic Stenosis, preop TAVR planning   ]    TECHNIQUE: ECG cardiac gated CT angiogram of the chest, abdomen and pelvis with 70 cc of Visipaque. Intravenous contrast. 3d [employer] and measurements performed on a [employer] workstation.    COMPARISON: none    FINDINGS:    Calcified aorta: moderate      AORTIC MEASUREMENTS: (mm)  Aortic annulus diameter (systole; mm): 18 x 21  Aortic annulus perimeter (systole; mm): 66  Distance of left coronary ostium to aortic annulus: 10  Distance of right coronary ostium to aortic annulus: 12  Sinus of Valsalva diameter (right coronary, diastole; mm): 27  Sinus of Valsalva diameter (left coronary, diastole; mm): 27  Sinus of Valsalva diameter (non coronary, diastole; mm): 25  Maximum ascending aorta diameter at 40 mm above annulus (diastole; mm): 39  Aortic root angulation (degrees): 49  Minimum abdominal aortic diameter: 10  Perpendicular abdominal aortic diameter at minimun: 13      Abdominal aortic aneurysm : No  Abdominal aortic AAA Stent : No    Thoracis aortic aneurysm : No  If yes,  maximum diameter (mm) :  Thoracic aortic TAA Stent : No    ACCESS VESSEL MEASUREMENTS:  Left Femoral:   Minimum Lumen Diameter (mm): 7      Diameter perpendicular to MLD (mm): 8      Tortuosity: No      Calcification: 90 to 180 degrees    Left External Iliac: Minimum Lumen Diameter (mm): 7       Diameter perpendicular to MLD (mm): 7      Tortuosity: moderate      Calcification:  Less than 90 degrees    Left Common Iliac:Minimum Lumen Diameter (mm): 8      Diameter perpendicular to MLD (mm): 10      Tortuosity: mild      Calcification: 90 to 180 degrees    Right Femoral: Minimum Lumen Diameter (mm): 7      Diameter perpendicular to MLD (mm): 8      Tortuosity: none      Calcification:  Less than 90 degrees    Right External Iliac:Minimum Lumen Diameter (mm): 6      Diameter perpendicular to MLD (mm): 8      Tortuosity: moderate      Calcification:  Less than 90 degrees    Right Common Iliac:   Minimum Lumen Diameter (mm): 8      Diameter perpendicular to MLD (mm): 9      Tortuosity: moderate      Calcification:  90 to 180 degrees      NON-VASCULAR FINDINGS:    Thyroid gland: unremarkable    Axillary lymph nodes: No significant.    Mediastinal lymph nodes: No significant.    Hilar Adenopathy: No significant.    Heart: Heart enlarged. Prosthetic aortic valve.    Coronary artery calcifications: Scattered calcifications    Pericardial effusion: No significant.    Lungs: Air-trapping. Patchy groundglass opacity. Left chest tube. Small left apical pneumothorax.        Liver: normal in size and configuration without focal mass.    Gallbladder: Gallstone.    Spleen:   normal in size.    Pancreas:   unremarkable.    Adrenal: No masses are seen.    Kidneys: No hydronephrosis. Multiple bilateral low-density lesions. High density 2.1 cm lesion on the right lower pole    No paraaortic or pelvic adenopathy.    Small bowel: No obstruction.    Colon: No wall thickening or pericolonic inflammatory changes.    Appendix:   within normal limits    Urinary bladder: unremarkable.    Gynecological structures are present.    Osseous structures: Sternotomy wires. Multilevel degenerative changes and scoliosis of the spine.    Asymmetric fat density in the right lateral inferior pelvis with soft tissue density extending into the right inguinal region. Please correlate clinically for hernia repair.        IMPRESSION:    Aortic measurements as described.    Indeterminate bilateral renal lesions for which contrast enhanced MRI can be performed to further characterize.    < end of copied text >

## 2021-02-25 NOTE — PROGRESS NOTE ADULT - PROBLEM SELECTOR PLAN 6
may need w/u in future for mitral clip of symptoms persist post valve in valve TAVR

## 2021-02-25 NOTE — DISCHARGE NOTE NURSING/CASE MANAGEMENT/SOCIAL WORK - PATIENT PORTAL LINK FT
You can access the FollowMyHealth Patient Portal offered by [facility] by registering at the following website: [email or web address]. By joining [facility]’s FollowMyHealth portal, you will also be able to view your health information using other applications (apps) compatible with our system.

## 2021-02-25 NOTE — PROGRESS NOTE ADULT - SUBJECTIVE AND OBJECTIVE BOX
SUBJECTIVE:  Cardiology NP F/U note:  SOB/ CAD/ pulm HTN /pre op Osei as out patient   denies complaints of chest pain/sob/dizziness/palps  rosalino diet / ambulating   	  MEDICATIONS  (STANDING):  heparin   Injectable 5000 Unit(s) SubCutaneous every 12 hours  levothyroxine 75 MICROGram(s) Oral daily  metoprolol tartrate 12.5 milliGRAM(s) Oral two times a day  multivitamin 1 Tablet(s) Oral daily  pantoprazole    Tablet 40 milliGRAM(s) Oral before breakfast  senna 2 Tablet(s) Oral at bedtime  sildenafil (REVATIO) 20 milliGRAM(s) Oral three times a day  simvastatin 20 milliGRAM(s) Oral at bedtime  sodium chloride 0.9% lock flush 3 milliLiter(s) IV Push every 8 hours    MEDICATIONS  (PRN):  benzocaine 15 mG/menthol 3.6 mG (Sugar-Free) Lozenge 1 Lozenge Oral every 3 hours PRN Sore Throat  oxycodone    5 mG/acetaminophen 325 mG 1 Tablet(s) Oral every 4 hours PRN Moderate Pain (4 - 6)  oxycodone    5 mG/acetaminophen 325 mG 2 Tablet(s) Oral every 4 hours PRN Severe Pain (7 - 10)    PHYSICAL EXAM:    T(C): 36.6 (21 @ 05:16), Max: 36.9 (21 @ 09:30)  HR: 75 (21 @ 05:16) (71 - 80)  BP: 136/71 (21 @ 05:16) (128/50 - 138/53)  RR: 18 (21 @ 05:16) (18 - 20)  SpO2: 96% (21 @ 05:16) (96% - 98%)    I&O's Summary    2021 07:01  -  2021 07:00  --------------------------------------------------------  IN: 560 mL / OUT: 650 mL / NET: -90 mL        Daily     Daily Weight in k (2021 06:16)    Appearance: NAD/ Pale  Cardiovascular: Normal S1 S2 RRR , No JVD, 3/6 SAQIB LSB to neck No edema  Respiratory: Lungs clear to auscultation	  Gastrointestinal:  Soft, Non-tender, + BS	  Skin: warm and dry  Neurologic: Non-focal  Extremities: Normal range of motion,  Vascular: Peripheral pulses palpable 2+ bilaterally    TELEMETRY:  RSR 70.s no arrhythmias overnight 	    ECG:  	  RADIOLOGY:   DIAGNOSTIC TESTING:  [ ] Echocardiogram:    < from: TTE Echo Limited or F/U (21 @ 09:36) >   Technically adequate study.   2. Normal global left ventricular systolic function.   3. Left ventricular ejection fraction, by visual estimation, is 55 to 60%.   4. Spectral Doppler shows pseudonormal pattern of left ventricular myocardial filling (Grade II diastolic dysfunction).   5. Elevated mean left atrial pressure.   6. Severely enlarged left atrium.   7. Mildly enlarged right atrium.   8. Mild thickening and calcification of the anterior mitral valve leaflet.   9. Moderate to severe mitral valve regurgitation.  10. Moderate mitral annular calcification.  11. Aortic valve is abnormally functioning bioprosthetic valve.  12. Severe aortic regurgitation.  13. Mild tricuspid regurgitation.  14. Estimated pulmonary artery systolic pressure is 51.4 mmHg assuming a right atrial pressure of 3 mmHg, which is consistent with moderate pulmonary hypertension.  15. There is no evidence of pericardial effusion.                                  9.6    9.17  )-----------( 185      ( 2021 06:03 )             30.5         138  |  105  |  43.0<H>  ----------------------------<  84  4.6   |  23.0  |  2.07<H>    Ca    9.7      2021 06:03  Mg     2.1           ASSESSMENT:    89 year old female referred for consultation by Dr. Hernandes. She has a Past Medical History pertinent for CAD, CKD (Stage III), HTN, Anemia, COPD on O2 at home, CHF- diastolic , Pulmonary Hypertension and Severe Mitral Valve Regurgitation, bio AVE  presents with SOB. Pt being worked up for AV. Recent LUCRECIA with severe AI. unable to have LHC 2/2 CHAIM, cr 2.8, lasix stopped at that time. Today presents to ED with worsening shortness of breath, dyspnea, orthopnea.    Dyspnea  -multifactorial hx COPD, diastolic HF, Aortic insufficiency, pulm HTN, left pleural effusion  -CT removed  Acute on Chronic Diastolic Heart Failure  Aortic insufficiency of Bio aortic valve   CAD  Pulm HTN  CHAIM on CKD (stage III)  -Hemodynamically stable   Plan:  Continue current meds and management   CTS f/u noted   will be rechecking BUN Creat today , if stable . d/c home   follow up; out pt       TY.

## 2021-02-25 NOTE — PROGRESS NOTE ADULT - PROBLEM SELECTOR PROBLEM 6
Nonrheumatic mitral valve regurgitation

## 2021-02-25 NOTE — PROGRESS NOTE ADULT - PROBLEM SELECTOR PLAN 3
s/p left pigtail d/c post CXR stable   f/u AM cxr prior to d/c home
recommend bedside pigtail drainage this am prior to TAVR CTA
s/p left pigtail draining serous fluid  d/c once minimal output

## 2021-02-25 NOTE — PROGRESS NOTE ADULT - SUBJECTIVE AND OBJECTIVE BOX
SUBJECTIVE:  Cardiology NP F/U note:  SOB/ CAD/ pulm HTN /pre op Osei as out patient   denies complaints of chest pain/sob/dizziness/palps  rosalino diet / ambulating     	  MEDICATIONS  (STANDING):  heparin   Injectable 5000 Unit(s) SubCutaneous every 12 hours  levothyroxine 75 MICROGram(s) Oral daily  metoprolol tartrate 12.5 milliGRAM(s) Oral two times a day  multivitamin 1 Tablet(s) Oral daily  pantoprazole    Tablet 40 milliGRAM(s) Oral before breakfast  senna 2 Tablet(s) Oral at bedtime  sildenafil (REVATIO) 20 milliGRAM(s) Oral three times a day  simvastatin 20 milliGRAM(s) Oral at bedtime  sodium chloride 0.9% lock flush 3 milliLiter(s) IV Push every 8 hours    MEDICATIONS  (PRN):  benzocaine 15 mG/menthol 3.6 mG (Sugar-Free) Lozenge 1 Lozenge Oral every 3 hours PRN Sore Throat  oxycodone    5 mG/acetaminophen 325 mG 1 Tablet(s) Oral every 4 hours PRN Moderate Pain (4 - 6)  oxycodone    5 mG/acetaminophen 325 mG 2 Tablet(s) Oral every 4 hours PRN Severe Pain (7 - 10)      PHYSICAL EXAM:    T(C): 36.6 (21 @ 05:16), Max: 36.9 (21 @ 09:30)  HR: 75 (21 @ 05:16) (71 - 80)  BP: 136/71 (21 @ 05:16) (128/50 - 138/53)  RR: 18 (21 @ 05:16) (18 - 20)  SpO2: 96% (21 @ 05:16) (96% - 98%)  Wt(kg): --    I&O's Summary    2021 07:01  -  2021 07:00  --------------------------------------------------------  IN: 560 mL / OUT: 650 mL / NET: -90 mL        Daily     Daily Weight in k (2021 06:16)    Appearance: NAD/ Pale  Cardiovascular: Normal S1 S2 RRR , No JVD, 3/6 SAQIB LSB to neck No edema  Respiratory: Lungs clear to auscultation	  Gastrointestinal:  Soft, Non-tender, + BS	  Skin: warm and dry  Neurologic: Non-focal  Extremities: Normal range of motion,  Vascular: Peripheral pulses palpable 2+ bilaterally    TELEMETRY:  RSR 70.s no arrhythmias overnight 	    ECG:  	  RADIOLOGY:   DIAGNOSTIC TESTING:  [ ] Echocardiogram:    < from: TTE Echo Limited or F/U (21 @ 09:36) >   Technically adequate study.   2. Normal global left ventricular systolic function.   3. Left ventricular ejection fraction, by visual estimation, is 55 to 60%.   4. Spectral Doppler shows pseudonormal pattern of left ventricular myocardial filling (Grade II diastolic dysfunction).   5. Elevated mean left atrial pressure.   6. Severely enlarged left atrium.   7. Mildly enlarged right atrium.   8. Mild thickening and calcification of the anterior mitral valve leaflet.   9. Moderate to severe mitral valve regurgitation.  10. Moderate mitral annular calcification.  11. Aortic valve is abnormally functioning bioprosthetic valve.  12. Severe aortic regurgitation.  13. Mild tricuspid regurgitation.  14. Estimated pulmonary artery systolic pressure is 51.4 mmHg assuming a right atrial pressure of 3 mmHg, which is consistent with moderate pulmonary hypertension.  15. There is no evidence of pericardial effusion.                                  9.6    9.17  )-----------( 185      ( 2021 06:03 )             30.5         138  |  105  |  43.0<H>  ----------------------------<  84  4.6   |  23.0  |  2.07<H>    Ca    9.7      2021 06:03  Mg     2.1           ASSESSMENT:    89 year old female referred for consultation by Dr. Hernandes. She has a Past Medical History pertinent for CAD, CKD (Stage III), HTN, Anemia, COPD on O2 at home, CHF- diastolic , Pulmonary Hypertension and Severe Mitral Valve Regurgitation, bio AVE  presents with SOB. Pt being worked up for AV. Recent LUCRECIA with severe AI. unable to have LHC 2/2 CHAIM, cr 2.8, lasix stopped at that time. Today presents to ED with worsening shortness of breath, dyspnea, orthopnea.    Dyspnea  -multifactorial hx COPD, diastolic HF, Aortic insufficiency, pulm HTN, left pleural effusion  -CT removed  Acute on Chronic Diastolic Heart Failure  Aortic insufficiency of Bio aortic valve   CAD  Pulm HTN  CHAIM on CKD (stage III)  -Hemodynamically stable   Plan:  Continue current meds and management   CTS f/u noted   will be rechecking BUN Creat today , if stable . d/c home   follow up; out pt

## 2021-02-25 NOTE — PROGRESS NOTE ADULT - PROBLEM SELECTOR PLAN 4
continue with metoprolol and statin

## 2021-02-25 NOTE — PROGRESS NOTE ADULT - PROBLEM SELECTOR PLAN 1
has seen Dr Denny for valve in valve TAVR work-up  NPO for TAVR protocol CTA this morning  dose lasix PRN
has seen Dr Denny for valve in valve TAVR work-up  underwent TAVR CTA C/A/P with hydration 2/2 stafe 3 CKD  TAVR CTA showing b/l renal lesions - may require MRI for additional evaluation   Follow up AM labs, restart Lasix as able
has seen Dr Denny for valve in valve TAVR work-up  underwent TAVR CTA C/A/P with hydration 2/2 stage 3 CKD  TAVR CTA showing b/l renal lesions - may require MRI for additional evaluation   Follow up AM labs, restart Lasix as able

## 2021-02-25 NOTE — PROGRESS NOTE ADULT - PROBLEM SELECTOR PLAN 10
heparin subQ and SCDs for DVT prophylaxis
heparin subQ and SCDs for DVT prophylaxis
heparin subQ and SCDs for DVT prophylaxis    Dispo: home    PLAN of care to be d/w Dr. Denny and CTS team in AM rounds

## 2021-02-25 NOTE — PROGRESS NOTE ADULT - NUTRITIONAL ASSESSMENT
This patient has been assessed with a concern for Malnutrition and has been determined to have a diagnosis/diagnoses of Severe protein-calorie malnutrition.    This patient is being managed with:   Diet DASH/TLC-ENSURE EnliveTID  Sodium & Cholesterol Restricted  Entered: Feb 22 2021  3:34PM

## 2021-02-25 NOTE — PROGRESS NOTE ADULT - PROBLEM SELECTOR PLAN 5
adjust meds per [facility]  Hold nephrotoxic meds  Trend urine output  Trend BMP
adjust meds per [facility]  Renal following  Hold nephrotoxic meds  Trend urine output  Trend BMP
adjust meds per [facility]  Renal following  Hold nephrotoxic meds  Trend urine output  Trend BMP

## 2021-02-25 NOTE — PROGRESS NOTE ADULT - PROBLEM SELECTOR PROBLEM 5
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

## 2021-02-25 NOTE — PROGRESS NOTE ADULT - ATTENDING COMMENTS
patient Out of Bed to Chair .  no events.  ct po diuretics.   outpatient valve in valve TAVR  for prosthetic aortic regurgitation.     No further in-patient cardiac work-up/management is needed.  Follow-up in cardiology office in 2 weeks.
severe prosthetic AI.  outpatient valve in valve VANDANA  ct current meds.  incentive spirometer. PO maintiance diuretic  plan to remove chest tube.    No further in-patient cardiac work-up/management is needed.  Follow-up in cardiology office in 2 weeks.
Spoke to sarah.  2018 cath: patent sorensen to lad.  no new cath needed. Benefits of TAVR outweigh the risk  LUCRECIA evaluated.  Flail leaflet and sefvefe ai without leaflet calcifications.  and Mild MR.   Discussed with kang. patient would like to get it done inpatient since she lives alone and no body available to take her for doing blood work and  pST .  we will discuss with CT surgery team about getting follow my heart at home PST vs. sending her to Rehab prior to TAVR. vs doing it in patient.  s/op right chest tube. draining/. will follow up

## 2021-02-25 NOTE — DISCHARGE NOTE NURSING/CASE MANAGEMENT/SOCIAL WORK - NSDCFUADDAPPT_GEN_ALL_CORE_FT
Please follow up with Dr. Denny on at [facility] for your scheduled TAVR procedure on 3/6/21  FOLLOW UP APPOINTMENT  CARDIOLOGY  DR ALMONTE  MARCH 3RD 2021 AT 11:30 AM  [phone]       Please follow up with Dr. Denny on at [facility] for your scheduled TAVR procedure on 3/6/21      *** STAR CHF PATIENT ***   Patient has a follow-up appointment scheduled with Dr Massiel Wolfe [phone] on March 3, 2021 at 11:30am.   Patient has no issues with obtaining her medications from her local pharmacy [place], [place], and she understands how to take them as prescribed.

## 2021-02-27 ENCOUNTER — APPOINTMENT (OUTPATIENT)
Dept: DISASTER EMERGENCY | Facility: CLINIC | Age: 86
End: 2021-02-27

## 2021-02-27 DIAGNOSIS — Z01.818 ENCOUNTER FOR OTHER PREPROCEDURAL EXAMINATION: ICD-10-CM

## 2021-03-02 NOTE — PATIENT PROFILE ADULT - NSPROHMSYMPCOND_GEN_A_NUR
cardiovascular/genitourinary Telephone interview with pt, instructed pt on pre-op instructions/teaching, tips for safer surgery, pain management scale, covid swab done resulted - negative. pt verbalized understanding of all instructions given./cardiovascular/genitourinary Telephone interview with pt, instructed pt on pre-op instructions/teaching, tips for safer surgery, pain management scale, covid swab done resulted - negative. pt verbalized understanding of all instructions given. Pt reports uses O2 2L/M HS and PRN during the day./cardiovascular/genitourinary

## 2021-03-04 ENCOUNTER — TRANSCRIPTION ENCOUNTER (OUTPATIENT)
Age: 86
End: 2021-03-04

## 2021-03-04 RX ORDER — HEPARIN SODIUM 5000 [USP'U]/ML
0 INJECTION INTRAVENOUS; SUBCUTANEOUS
Qty: 0 | Refills: 0 | DISCHARGE

## 2021-03-04 RX ORDER — BENZOCAINE AND MENTHOL 5; 1 G/100ML; G/100ML
1 LIQUID ORAL
Qty: 0 | Refills: 0 | DISCHARGE

## 2021-03-05 ENCOUNTER — NON-APPOINTMENT (OUTPATIENT)
Age: 86
End: 2021-03-05

## 2021-03-05 ENCOUNTER — INPATIENT (INPATIENT)
Facility: HOSPITAL | Age: 86
LOS: 1 days | Discharge: HOME | DRG: 267 | End: 2021-03-07
Attending: THORACIC SURGERY (CARDIOTHORACIC VASCULAR SURGERY) | Admitting: THORACIC SURGERY (CARDIOTHORACIC VASCULAR SURGERY)
Payer: MEDICARE

## 2021-03-05 ENCOUNTER — APPOINTMENT (OUTPATIENT)
Dept: CARDIOTHORACIC SURGERY | Facility: HOSPITAL | Age: 86
End: 2021-03-05

## 2021-03-05 ENCOUNTER — APPOINTMENT (OUTPATIENT)
Dept: CARDIOTHORACIC SURGERY | Facility: CLINIC | Age: 86
End: 2021-03-05

## 2021-03-05 VITALS
HEART RATE: 80 BPM | HEIGHT: 61 IN | WEIGHT: 134.04 LBS | RESPIRATION RATE: 15 BRPM | OXYGEN SATURATION: 99 % | TEMPERATURE: 98 F | DIASTOLIC BLOOD PRESSURE: 50 MMHG | SYSTOLIC BLOOD PRESSURE: 133 MMHG

## 2021-03-05 DIAGNOSIS — T82.857A STENOSIS OF OTHER CARDIAC PROSTHETIC DEVICES, IMPLANTS AND GRAFTS, INITIAL ENCOUNTER: ICD-10-CM

## 2021-03-05 DIAGNOSIS — Z95.2 PRESENCE OF PROSTHETIC HEART VALVE: Chronic | ICD-10-CM

## 2021-03-05 DIAGNOSIS — Z98.890 OTHER SPECIFIED POSTPROCEDURAL STATES: Chronic | ICD-10-CM

## 2021-03-05 DIAGNOSIS — T82.857A STENOSIS OF OTHER CARDIAC PROSTHETIC, INITIAL ENCOUNTER: ICD-10-CM

## 2021-03-05 LAB
ALBUMIN SERPL ELPH-MCNC: 3.9 G/DL — SIGNIFICANT CHANGE UP (ref 3.3–5.2)
ALP SERPL-CCNC: 42 U/L — SIGNIFICANT CHANGE UP (ref 40–120)
ALT FLD-CCNC: 8 U/L — SIGNIFICANT CHANGE UP
ANION GAP SERPL CALC-SCNC: 16 MMOL/L — SIGNIFICANT CHANGE UP (ref 5–17)
APTT BLD: 145 SEC — CRITICAL HIGH (ref 27.5–35.5)
APTT BLD: 192.3 SEC — CRITICAL HIGH (ref 27.5–35.5)
APTT BLD: 26.9 SEC — LOW (ref 27.5–35.5)
AST SERPL-CCNC: 24 U/L — SIGNIFICANT CHANGE UP
BASOPHILS # BLD AUTO: 0.1 K/UL — SIGNIFICANT CHANGE UP (ref 0–0.2)
BASOPHILS NFR BLD AUTO: 0.8 % — SIGNIFICANT CHANGE UP (ref 0–2)
BILIRUB SERPL-MCNC: 0.4 MG/DL — SIGNIFICANT CHANGE UP (ref 0.4–2)
BLD GP AB SCN SERPL QL: SIGNIFICANT CHANGE UP
BUN SERPL-MCNC: 66 MG/DL — HIGH (ref 8–20)
CALCIUM SERPL-MCNC: 9.7 MG/DL — SIGNIFICANT CHANGE UP (ref 8.6–10.2)
CHLORIDE SERPL-SCNC: 102 MMOL/L — SIGNIFICANT CHANGE UP (ref 98–107)
CO2 SERPL-SCNC: 23 MMOL/L — SIGNIFICANT CHANGE UP (ref 22–29)
CREAT SERPL-MCNC: 2.4 MG/DL — HIGH (ref 0.5–1.3)
EOSINOPHIL # BLD AUTO: 0.34 K/UL — SIGNIFICANT CHANGE UP (ref 0–0.5)
EOSINOPHIL NFR BLD AUTO: 2.7 % — SIGNIFICANT CHANGE UP (ref 0–6)
GAS PNL BLDA: SIGNIFICANT CHANGE UP
GLUCOSE SERPL-MCNC: 139 MG/DL — HIGH (ref 70–99)
HCT VFR BLD CALC: 29.4 % — LOW (ref 34.5–45)
HGB BLD-MCNC: 9.1 G/DL — LOW (ref 11.5–15.5)
IMM GRANULOCYTES NFR BLD AUTO: 0.9 % — SIGNIFICANT CHANGE UP (ref 0–1.5)
INR BLD: 1.1 RATIO — SIGNIFICANT CHANGE UP (ref 0.88–1.16)
INR BLD: 1.13 RATIO — SIGNIFICANT CHANGE UP (ref 0.88–1.16)
LYMPHOCYTES # BLD AUTO: 0.62 K/UL — LOW (ref 1–3.3)
LYMPHOCYTES # BLD AUTO: 5 % — LOW (ref 13–44)
MAGNESIUM SERPL-MCNC: 1.9 MG/DL — SIGNIFICANT CHANGE UP (ref 1.6–2.6)
MCHC RBC-ENTMCNC: 27.5 PG — SIGNIFICANT CHANGE UP (ref 27–34)
MCHC RBC-ENTMCNC: 31 GM/DL — LOW (ref 32–36)
MCV RBC AUTO: 88.8 FL — SIGNIFICANT CHANGE UP (ref 80–100)
MONOCYTES # BLD AUTO: 0.33 K/UL — SIGNIFICANT CHANGE UP (ref 0–0.9)
MONOCYTES NFR BLD AUTO: 2.7 % — SIGNIFICANT CHANGE UP (ref 2–14)
NEUTROPHILS # BLD AUTO: 10.88 K/UL — HIGH (ref 1.8–7.4)
NEUTROPHILS NFR BLD AUTO: 87.9 % — HIGH (ref 43–77)
PLATELET # BLD AUTO: 258 K/UL — SIGNIFICANT CHANGE UP (ref 150–400)
POTASSIUM SERPL-MCNC: 4.2 MMOL/L — SIGNIFICANT CHANGE UP (ref 3.5–5.3)
POTASSIUM SERPL-SCNC: 4.2 MMOL/L — SIGNIFICANT CHANGE UP (ref 3.5–5.3)
PROT SERPL-MCNC: 6.5 G/DL — LOW (ref 6.6–8.7)
PROTHROM AB SERPL-ACNC: 12.7 SEC — SIGNIFICANT CHANGE UP (ref 10.6–13.6)
PROTHROM AB SERPL-ACNC: 13 SEC — SIGNIFICANT CHANGE UP (ref 10.6–13.6)
RBC # BLD: 3.31 M/UL — LOW (ref 3.8–5.2)
RBC # FLD: 15.9 % — HIGH (ref 10.3–14.5)
SODIUM SERPL-SCNC: 141 MMOL/L — SIGNIFICANT CHANGE UP (ref 135–145)
WBC # BLD: 12.38 K/UL — HIGH (ref 3.8–10.5)
WBC # FLD AUTO: 12.38 K/UL — HIGH (ref 3.8–10.5)

## 2021-03-05 PROCEDURE — 93306 TTE W/DOPPLER COMPLETE: CPT | Mod: 26

## 2021-03-05 PROCEDURE — 33362 REPLACE AORTIC VALVE OPEN: CPT | Mod: 62,Q0

## 2021-03-05 PROCEDURE — 93010 ELECTROCARDIOGRAM REPORT: CPT

## 2021-03-05 PROCEDURE — 93355 ECHO TRANSESOPHAGEAL (TEE): CPT

## 2021-03-05 PROCEDURE — 71045 X-RAY EXAM CHEST 1 VIEW: CPT | Mod: 26

## 2021-03-05 RX ORDER — SIMVASTATIN 20 MG/1
20 TABLET, FILM COATED ORAL AT BEDTIME
Refills: 0 | Status: DISCONTINUED | OUTPATIENT
Start: 2021-03-05 | End: 2021-03-07

## 2021-03-05 RX ORDER — POTASSIUM CHLORIDE 20 MEQ
10 PACKET (EA) ORAL
Refills: 0 | Status: DISCONTINUED | OUTPATIENT
Start: 2021-03-05 | End: 2021-03-05

## 2021-03-05 RX ORDER — CEFUROXIME AXETIL 250 MG
1500 TABLET ORAL ONCE
Refills: 0 | Status: DISCONTINUED | OUTPATIENT
Start: 2021-03-05 | End: 2021-03-05

## 2021-03-05 RX ORDER — METOPROLOL TARTRATE 50 MG
1 TABLET ORAL
Qty: 0 | Refills: 0 | DISCHARGE

## 2021-03-05 RX ORDER — CEFUROXIME AXETIL 250 MG
1500 TABLET ORAL EVERY 8 HOURS
Refills: 0 | Status: DISCONTINUED | OUTPATIENT
Start: 2021-03-05 | End: 2021-03-05

## 2021-03-05 RX ORDER — BENZOCAINE AND MENTHOL 5; 1 G/100ML; G/100ML
1 LIQUID ORAL
Refills: 0 | Status: DISCONTINUED | OUTPATIENT
Start: 2021-03-05 | End: 2021-03-07

## 2021-03-05 RX ORDER — CHLORHEXIDINE GLUCONATE 213 G/1000ML
5 SOLUTION TOPICAL
Refills: 0 | Status: DISCONTINUED | OUTPATIENT
Start: 2021-03-05 | End: 2021-03-05

## 2021-03-05 RX ORDER — SENNA PLUS 8.6 MG/1
2 TABLET ORAL AT BEDTIME
Refills: 0 | Status: DISCONTINUED | OUTPATIENT
Start: 2021-03-05 | End: 2021-03-07

## 2021-03-05 RX ORDER — PANTOPRAZOLE SODIUM 20 MG/1
1 TABLET, DELAYED RELEASE ORAL
Qty: 0 | Refills: 0 | DISCHARGE

## 2021-03-05 RX ORDER — PANTOPRAZOLE SODIUM 20 MG/1
40 TABLET, DELAYED RELEASE ORAL DAILY
Refills: 0 | Status: DISCONTINUED | OUTPATIENT
Start: 2021-03-06 | End: 2021-03-07

## 2021-03-05 RX ORDER — MEPERIDINE HYDROCHLORIDE 50 MG/ML
25 INJECTION INTRAMUSCULAR; INTRAVENOUS; SUBCUTANEOUS ONCE
Refills: 0 | Status: DISCONTINUED | OUTPATIENT
Start: 2021-03-05 | End: 2021-03-05

## 2021-03-05 RX ORDER — ACETAMINOPHEN 500 MG
1000 TABLET ORAL ONCE
Refills: 0 | Status: COMPLETED | OUTPATIENT
Start: 2021-03-05 | End: 2021-03-06

## 2021-03-05 RX ORDER — LEVOTHYROXINE SODIUM 125 MCG
75 TABLET ORAL DAILY
Refills: 0 | Status: DISCONTINUED | OUTPATIENT
Start: 2021-03-05 | End: 2021-03-07

## 2021-03-05 RX ORDER — ASPIRIN/CALCIUM CARB/MAGNESIUM 324 MG
81 TABLET ORAL DAILY
Refills: 0 | Status: DISCONTINUED | OUTPATIENT
Start: 2021-03-05 | End: 2021-03-07

## 2021-03-05 RX ORDER — SODIUM CHLORIDE 9 MG/ML
1000 INJECTION INTRAMUSCULAR; INTRAVENOUS; SUBCUTANEOUS
Refills: 0 | Status: DISCONTINUED | OUTPATIENT
Start: 2021-03-05 | End: 2021-03-07

## 2021-03-05 RX ORDER — MAGNESIUM SULFATE 500 MG/ML
1 VIAL (ML) INJECTION ONCE
Refills: 0 | Status: COMPLETED | OUTPATIENT
Start: 2021-03-05 | End: 2021-03-05

## 2021-03-05 RX ORDER — CLOPIDOGREL BISULFATE 75 MG/1
75 TABLET, FILM COATED ORAL DAILY
Refills: 0 | Status: DISCONTINUED | OUTPATIENT
Start: 2021-03-05 | End: 2021-03-07

## 2021-03-05 RX ORDER — CEFUROXIME AXETIL 250 MG
1500 TABLET ORAL EVERY 8 HOURS
Refills: 0 | Status: COMPLETED | OUTPATIENT
Start: 2021-03-06 | End: 2021-03-06

## 2021-03-05 RX ORDER — PSYLLIUM SEED (WITH DEXTROSE)
1 POWDER (GRAM) ORAL THREE TIMES A DAY
Refills: 0 | Status: DISCONTINUED | OUTPATIENT
Start: 2021-03-05 | End: 2021-03-07

## 2021-03-05 RX ORDER — SODIUM CHLORIDE 9 MG/ML
1000 INJECTION INTRAMUSCULAR; INTRAVENOUS; SUBCUTANEOUS
Refills: 0 | Status: DISCONTINUED | OUTPATIENT
Start: 2021-03-05 | End: 2021-03-05

## 2021-03-05 RX ORDER — L.ACIDOPH/B.ANIMALIS/B.LONGUM 15B CELL
1 CAPSULE ORAL
Qty: 0 | Refills: 0 | DISCHARGE

## 2021-03-05 RX ORDER — PANTOPRAZOLE SODIUM 20 MG/1
40 TABLET, DELAYED RELEASE ORAL ONCE
Refills: 0 | Status: COMPLETED | OUTPATIENT
Start: 2021-03-05 | End: 2021-03-05

## 2021-03-05 RX ADMIN — Medication 1 PACKET(S): at 18:13

## 2021-03-05 RX ADMIN — Medication 81 MILLIGRAM(S): at 21:54

## 2021-03-05 RX ADMIN — CLOPIDOGREL BISULFATE 75 MILLIGRAM(S): 75 TABLET, FILM COATED ORAL at 21:54

## 2021-03-05 RX ADMIN — BENZOCAINE AND MENTHOL 1 LOZENGE: 5; 1 LIQUID ORAL at 22:01

## 2021-03-05 RX ADMIN — Medication 1 PACKET(S): at 22:00

## 2021-03-05 RX ADMIN — PANTOPRAZOLE SODIUM 40 MILLIGRAM(S): 20 TABLET, DELAYED RELEASE ORAL at 11:30

## 2021-03-05 RX ADMIN — Medication 100 GRAM(S): at 11:31

## 2021-03-05 RX ADMIN — Medication 1 TABLET(S): at 21:54

## 2021-03-05 RX ADMIN — Medication 100 MILLIGRAM(S): at 18:13

## 2021-03-05 RX ADMIN — SIMVASTATIN 20 MILLIGRAM(S): 20 TABLET, FILM COATED ORAL at 22:43

## 2021-03-05 RX ADMIN — SENNA PLUS 2 TABLET(S): 8.6 TABLET ORAL at 21:54

## 2021-03-05 NOTE — BRIEF OPERATIVE NOTE - NSICDXBRIEFPREOP_GEN_ALL_CORE_FT
PRE-OP DIAGNOSIS:  Chronic diastolic CHF (congestive heart failure), NYHA class 4 05-Mar-2021 08:41:00  Reagan Kaplan  Severe aortic insufficiency 05-Mar-2021 08:40:48  Reagan Kaplan  Prosthetic heart valve failure 05-Mar-2021 08:40:35  Reagan Kaplan

## 2021-03-05 NOTE — BRIEF OPERATIVE NOTE - NSICDXBRIEFPROCEDURE_GEN_ALL_CORE_FT
PROCEDURES:  TAVR, open femoral artery approach 05-Mar-2021 08:42:20 Transfemoral Valve in Valve TAVR via Right Common Femoral Artery Cutdown (23mm CoreValve Evolut Pro+) (NCT# [id number]) (STS/ACC TVT Registry Patient ID# [id number]) Reagan Kaplan

## 2021-03-05 NOTE — BRIEF OPERATIVE NOTE - COMMENTS
[employer] Representative: Wicho Abdi & Anderson Schmitt (clinical support)  Invasive Lines: Right Radial Arterial Line  IV Medication Infusions: ? [employer] Representative: Wicho Abdi & Anderson Schmitt (clinical support)  Invasive Lines: Right Radial Arterial Line  IV Medication Infusions: Levophed Used

## 2021-03-05 NOTE — BRIEF OPERATIVE NOTE - OPERATION/FINDINGS
Severe Bioprosthetic AI, Post Deployment ? PVL, ? Conduction or Rhythm Disturbances, Extubated in the OR? Severe Bioprosthetic AI, Post Deployment Trace PVL, No Conduction or Rhythm Disturbances, Extubated in the OR

## 2021-03-05 NOTE — CHART NOTE - NSCHARTNOTEFT_GEN_A_CORE
Commercial 23mm [employer] CoreValve Evolut Pro+ Transfemoral Valve in Valve TAVR via Right Common Femoral Artery Cutdown.  NCT# [id number], STS/ACC TVT Registry Patient ID# [id number].

## 2021-03-05 NOTE — BRIEF OPERATIVE NOTE - NSICDXBRIEFPOSTOP_GEN_ALL_CORE_FT
POST-OP DIAGNOSIS:  Chronic diastolic CHF (congestive heart failure), NYHA class 4 05-Mar-2021 08:41:42  Reagan Kaplan  Severe aortic insufficiency 05-Mar-2021 08:41:31  Reagan Kaplan  Prosthetic heart valve failure 05-Mar-2021 08:41:20  Reagan Kaplan

## 2021-03-06 ENCOUNTER — TRANSCRIPTION ENCOUNTER (OUTPATIENT)
Age: 86
End: 2021-03-06

## 2021-03-06 DIAGNOSIS — D63.8 ANEMIA IN OTHER CHRONIC DISEASES CLASSIFIED ELSEWHERE: ICD-10-CM

## 2021-03-06 DIAGNOSIS — E03.9 HYPOTHYROIDISM, UNSPECIFIED: ICD-10-CM

## 2021-03-06 DIAGNOSIS — I27.20 PULMONARY HYPERTENSION, UNSPECIFIED: ICD-10-CM

## 2021-03-06 DIAGNOSIS — E78.5 HYPERLIPIDEMIA, UNSPECIFIED: ICD-10-CM

## 2021-03-06 DIAGNOSIS — Z29.9 ENCOUNTER FOR PROPHYLACTIC MEASURES, UNSPECIFIED: ICD-10-CM

## 2021-03-06 DIAGNOSIS — I50.32 CHRONIC DIASTOLIC (CONGESTIVE) HEART FAILURE: ICD-10-CM

## 2021-03-06 DIAGNOSIS — I35.1 NONRHEUMATIC AORTIC (VALVE) INSUFFICIENCY: ICD-10-CM

## 2021-03-06 DIAGNOSIS — I10 ESSENTIAL (PRIMARY) HYPERTENSION: ICD-10-CM

## 2021-03-06 DIAGNOSIS — N18.4 CHRONIC KIDNEY DISEASE, STAGE 4 (SEVERE): ICD-10-CM

## 2021-03-06 LAB
ANION GAP SERPL CALC-SCNC: 15 MMOL/L — SIGNIFICANT CHANGE UP (ref 5–17)
ANION GAP SERPL CALC-SCNC: 17 MMOL/L — SIGNIFICANT CHANGE UP (ref 5–17)
BASOPHILS # BLD AUTO: 0.06 K/UL — SIGNIFICANT CHANGE UP (ref 0–0.2)
BASOPHILS NFR BLD AUTO: 0.4 % — SIGNIFICANT CHANGE UP (ref 0–2)
BUN SERPL-MCNC: 65 MG/DL — HIGH (ref 8–20)
BUN SERPL-MCNC: 70 MG/DL — HIGH (ref 8–20)
CALCIUM SERPL-MCNC: 9.4 MG/DL — SIGNIFICANT CHANGE UP (ref 8.6–10.2)
CALCIUM SERPL-MCNC: 9.4 MG/DL — SIGNIFICANT CHANGE UP (ref 8.6–10.2)
CHLORIDE SERPL-SCNC: 96 MMOL/L — LOW (ref 98–107)
CHLORIDE SERPL-SCNC: 97 MMOL/L — LOW (ref 98–107)
CO2 SERPL-SCNC: 24 MMOL/L — SIGNIFICANT CHANGE UP (ref 22–29)
CO2 SERPL-SCNC: 27 MMOL/L — SIGNIFICANT CHANGE UP (ref 22–29)
CREAT SERPL-MCNC: 2.43 MG/DL — HIGH (ref 0.5–1.3)
CREAT SERPL-MCNC: 2.59 MG/DL — HIGH (ref 0.5–1.3)
EOSINOPHIL # BLD AUTO: 0.02 K/UL — SIGNIFICANT CHANGE UP (ref 0–0.5)
EOSINOPHIL NFR BLD AUTO: 0.1 % — SIGNIFICANT CHANGE UP (ref 0–6)
GLUCOSE SERPL-MCNC: 120 MG/DL — HIGH (ref 70–99)
GLUCOSE SERPL-MCNC: 83 MG/DL — SIGNIFICANT CHANGE UP (ref 70–99)
HCT VFR BLD CALC: 28 % — LOW (ref 34.5–45)
HGB BLD-MCNC: 8.6 G/DL — LOW (ref 11.5–15.5)
IMM GRANULOCYTES NFR BLD AUTO: 0.6 % — SIGNIFICANT CHANGE UP (ref 0–1.5)
LYMPHOCYTES # BLD AUTO: 0.74 K/UL — LOW (ref 1–3.3)
LYMPHOCYTES # BLD AUTO: 5.2 % — LOW (ref 13–44)
MAGNESIUM SERPL-MCNC: 2.6 MG/DL — SIGNIFICANT CHANGE UP (ref 1.6–2.6)
MCHC RBC-ENTMCNC: 27.7 PG — SIGNIFICANT CHANGE UP (ref 27–34)
MCHC RBC-ENTMCNC: 30.7 GM/DL — LOW (ref 32–36)
MCV RBC AUTO: 90 FL — SIGNIFICANT CHANGE UP (ref 80–100)
MONOCYTES # BLD AUTO: 1.84 K/UL — HIGH (ref 0–0.9)
MONOCYTES NFR BLD AUTO: 13 % — SIGNIFICANT CHANGE UP (ref 2–14)
NEUTROPHILS # BLD AUTO: 11.44 K/UL — HIGH (ref 1.8–7.4)
NEUTROPHILS NFR BLD AUTO: 80.7 % — HIGH (ref 43–77)
PLATELET # BLD AUTO: 275 K/UL — SIGNIFICANT CHANGE UP (ref 150–400)
POTASSIUM SERPL-MCNC: 4.6 MMOL/L — SIGNIFICANT CHANGE UP (ref 3.5–5.3)
POTASSIUM SERPL-MCNC: 4.7 MMOL/L — SIGNIFICANT CHANGE UP (ref 3.5–5.3)
POTASSIUM SERPL-SCNC: 4.6 MMOL/L — SIGNIFICANT CHANGE UP (ref 3.5–5.3)
POTASSIUM SERPL-SCNC: 4.7 MMOL/L — SIGNIFICANT CHANGE UP (ref 3.5–5.3)
RBC # BLD: 3.11 M/UL — LOW (ref 3.8–5.2)
RBC # FLD: 16 % — HIGH (ref 10.3–14.5)
SODIUM SERPL-SCNC: 137 MMOL/L — SIGNIFICANT CHANGE UP (ref 135–145)
SODIUM SERPL-SCNC: 138 MMOL/L — SIGNIFICANT CHANGE UP (ref 135–145)
WBC # BLD: 14.19 K/UL — HIGH (ref 3.8–10.5)
WBC # FLD AUTO: 14.19 K/UL — HIGH (ref 3.8–10.5)

## 2021-03-06 PROCEDURE — 71045 X-RAY EXAM CHEST 1 VIEW: CPT | Mod: 26

## 2021-03-06 PROCEDURE — 99232 SBSQ HOSP IP/OBS MODERATE 35: CPT

## 2021-03-06 PROCEDURE — 93010 ELECTROCARDIOGRAM REPORT: CPT

## 2021-03-06 RX ORDER — SODIUM CHLORIDE 9 MG/ML
1000 INJECTION INTRAMUSCULAR; INTRAVENOUS; SUBCUTANEOUS
Refills: 0 | Status: DISCONTINUED | OUTPATIENT
Start: 2021-03-06 | End: 2021-03-07

## 2021-03-06 RX ORDER — ALBUMIN HUMAN 25 %
250 VIAL (ML) INTRAVENOUS ONCE
Refills: 0 | Status: COMPLETED | OUTPATIENT
Start: 2021-03-06 | End: 2021-03-06

## 2021-03-06 RX ORDER — METOPROLOL TARTRATE 50 MG
25 TABLET ORAL DAILY
Refills: 0 | Status: DISCONTINUED | OUTPATIENT
Start: 2021-03-06 | End: 2021-03-07

## 2021-03-06 RX ORDER — POLYETHYLENE GLYCOL 3350 17 G/17G
17 POWDER, FOR SOLUTION ORAL DAILY
Refills: 0 | Status: DISCONTINUED | OUTPATIENT
Start: 2021-03-06 | End: 2021-03-07

## 2021-03-06 RX ORDER — ACETAMINOPHEN 500 MG
650 TABLET ORAL ONCE
Refills: 0 | Status: COMPLETED | OUTPATIENT
Start: 2021-03-06 | End: 2021-03-06

## 2021-03-06 RX ORDER — SODIUM CHLORIDE 9 MG/ML
3 INJECTION INTRAMUSCULAR; INTRAVENOUS; SUBCUTANEOUS EVERY 8 HOURS
Refills: 0 | Status: DISCONTINUED | OUTPATIENT
Start: 2021-03-06 | End: 2021-03-07

## 2021-03-06 RX ORDER — ENOXAPARIN SODIUM 100 MG/ML
30 INJECTION SUBCUTANEOUS ONCE
Refills: 0 | Status: COMPLETED | OUTPATIENT
Start: 2021-03-06 | End: 2021-03-06

## 2021-03-06 RX ADMIN — Medication 650 MILLIGRAM(S): at 22:30

## 2021-03-06 RX ADMIN — Medication 20 MILLIGRAM(S): at 22:36

## 2021-03-06 RX ADMIN — Medication 1 PACKET(S): at 22:36

## 2021-03-06 RX ADMIN — Medication 100 MILLIGRAM(S): at 00:21

## 2021-03-06 RX ADMIN — Medication 1 TABLET(S): at 15:03

## 2021-03-06 RX ADMIN — SODIUM CHLORIDE 3 MILLILITER(S): 9 INJECTION INTRAMUSCULAR; INTRAVENOUS; SUBCUTANEOUS at 22:36

## 2021-03-06 RX ADMIN — Medication 75 MICROGRAM(S): at 05:27

## 2021-03-06 RX ADMIN — Medication 1 PACKET(S): at 15:04

## 2021-03-06 RX ADMIN — SIMVASTATIN 20 MILLIGRAM(S): 20 TABLET, FILM COATED ORAL at 22:36

## 2021-03-06 RX ADMIN — Medication 1000 MILLIGRAM(S): at 09:14

## 2021-03-06 RX ADMIN — Medication 100 MILLIGRAM(S): at 09:11

## 2021-03-06 RX ADMIN — Medication 25 MILLIGRAM(S): at 09:10

## 2021-03-06 RX ADMIN — Medication 20 MILLIGRAM(S): at 05:27

## 2021-03-06 RX ADMIN — POLYETHYLENE GLYCOL 3350 17 GRAM(S): 17 POWDER, FOR SOLUTION ORAL at 15:20

## 2021-03-06 RX ADMIN — CLOPIDOGREL BISULFATE 75 MILLIGRAM(S): 75 TABLET, FILM COATED ORAL at 12:20

## 2021-03-06 RX ADMIN — SENNA PLUS 2 TABLET(S): 8.6 TABLET ORAL at 22:36

## 2021-03-06 RX ADMIN — Medication 1 PACKET(S): at 05:27

## 2021-03-06 RX ADMIN — PANTOPRAZOLE SODIUM 40 MILLIGRAM(S): 20 TABLET, DELAYED RELEASE ORAL at 12:20

## 2021-03-06 RX ADMIN — Medication 81 MILLIGRAM(S): at 12:20

## 2021-03-06 RX ADMIN — Medication 650 MILLIGRAM(S): at 23:00

## 2021-03-06 RX ADMIN — SODIUM CHLORIDE 3 MILLILITER(S): 9 INJECTION INTRAMUSCULAR; INTRAVENOUS; SUBCUTANEOUS at 14:37

## 2021-03-06 RX ADMIN — Medication 400 MILLIGRAM(S): at 09:11

## 2021-03-06 RX ADMIN — Medication 125 MILLILITER(S): at 04:19

## 2021-03-06 RX ADMIN — Medication 20 MILLIGRAM(S): at 15:03

## 2021-03-06 NOTE — PROGRESS NOTE ADULT - ASSESSMENT
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 Denny on 3/5/2021. Intraop course significant for post deployment trace PVL and no conduction or rhythm changes. ICU course uneventful.

## 2021-03-06 NOTE — PROGRESS NOTE ADULT - PROBLEM SELECTOR PLAN 1
s/p TAVR for severe bioprosthetic AI  via RCFA cutdown  ASA 81, Plavix for valve prophylaxis  Discuss restarting Metoprolol with Dr. Denny s/p TAVR for severe bioprosthetic AI   via RCFA cutdown  ASA 81, Plavix for valve prophylaxis  post procedure TTE: EF 45-50%, no paravalvular leak  ECG with 1st degree bloc ISRRAEL 246  Discuss restarting Metoprolol with Dr. Denny

## 2021-03-06 NOTE — PROGRESS NOTE ADULT - PROBLEM SELECTOR PLAN 6
Creatinine uptrending this AM  evidence of creatnine near 2.34 in january 2021  discuss with Dr. Denny

## 2021-03-07 ENCOUNTER — TRANSCRIPTION ENCOUNTER (OUTPATIENT)
Age: 86
End: 2021-03-07

## 2021-03-07 VITALS
SYSTOLIC BLOOD PRESSURE: 132 MMHG | HEART RATE: 85 BPM | RESPIRATION RATE: 28 BRPM | OXYGEN SATURATION: 88 % | DIASTOLIC BLOOD PRESSURE: 61 MMHG

## 2021-03-07 LAB
ANION GAP SERPL CALC-SCNC: 13 MMOL/L — SIGNIFICANT CHANGE UP (ref 5–17)
BUN SERPL-MCNC: 61 MG/DL — HIGH (ref 8–20)
CALCIUM SERPL-MCNC: 9 MG/DL — SIGNIFICANT CHANGE UP (ref 8.6–10.2)
CHLORIDE SERPL-SCNC: 100 MMOL/L — SIGNIFICANT CHANGE UP (ref 98–107)
CO2 SERPL-SCNC: 23 MMOL/L — SIGNIFICANT CHANGE UP (ref 22–29)
CREAT SERPL-MCNC: 2.09 MG/DL — HIGH (ref 0.5–1.3)
GLUCOSE SERPL-MCNC: 93 MG/DL — SIGNIFICANT CHANGE UP (ref 70–99)
HCT VFR BLD CALC: 26.7 % — LOW (ref 34.5–45)
HGB BLD-MCNC: 8.3 G/DL — LOW (ref 11.5–15.5)
MAGNESIUM SERPL-MCNC: 2.3 MG/DL — SIGNIFICANT CHANGE UP (ref 1.6–2.6)
MCHC RBC-ENTMCNC: 28.3 PG — SIGNIFICANT CHANGE UP (ref 27–34)
MCHC RBC-ENTMCNC: 31.1 GM/DL — LOW (ref 32–36)
MCV RBC AUTO: 91.1 FL — SIGNIFICANT CHANGE UP (ref 80–100)
PLATELET # BLD AUTO: 241 K/UL — SIGNIFICANT CHANGE UP (ref 150–400)
POTASSIUM SERPL-MCNC: 4.9 MMOL/L — SIGNIFICANT CHANGE UP (ref 3.5–5.3)
POTASSIUM SERPL-SCNC: 4.9 MMOL/L — SIGNIFICANT CHANGE UP (ref 3.5–5.3)
RBC # BLD: 2.93 M/UL — LOW (ref 3.8–5.2)
RBC # FLD: 16 % — HIGH (ref 10.3–14.5)
SODIUM SERPL-SCNC: 136 MMOL/L — SIGNIFICANT CHANGE UP (ref 135–145)
WBC # BLD: 13.29 K/UL — HIGH (ref 3.8–10.5)
WBC # FLD AUTO: 13.29 K/UL — HIGH (ref 3.8–10.5)

## 2021-03-07 PROCEDURE — 99238 HOSP IP/OBS DSCHRG MGMT 30/<: CPT

## 2021-03-07 PROCEDURE — 71045 X-RAY EXAM CHEST 1 VIEW: CPT | Mod: 26

## 2021-03-07 PROCEDURE — 93010 ELECTROCARDIOGRAM REPORT: CPT

## 2021-03-07 RX ORDER — ASPIRIN/CALCIUM CARB/MAGNESIUM 324 MG
1 TABLET ORAL
Qty: 0 | Refills: 0 | DISCHARGE
Start: 2021-03-07

## 2021-03-07 RX ORDER — CLOPIDOGREL BISULFATE 75 MG/1
1 TABLET, FILM COATED ORAL
Qty: 30 | Refills: 0
Start: 2021-03-07 | End: 2021-04-05

## 2021-03-07 RX ORDER — CLOPIDOGREL BISULFATE 75 MG/1
1 TABLET, FILM COATED ORAL
Qty: 0 | Refills: 0 | DISCHARGE
Start: 2021-03-07

## 2021-03-07 RX ORDER — ASPIRIN/CALCIUM CARB/MAGNESIUM 324 MG
1 TABLET ORAL
Qty: 0 | Refills: 0 | DISCHARGE

## 2021-03-07 RX ADMIN — CLOPIDOGREL BISULFATE 75 MILLIGRAM(S): 75 TABLET, FILM COATED ORAL at 12:15

## 2021-03-07 RX ADMIN — PANTOPRAZOLE SODIUM 40 MILLIGRAM(S): 20 TABLET, DELAYED RELEASE ORAL at 12:15

## 2021-03-07 RX ADMIN — BENZOCAINE AND MENTHOL 1 LOZENGE: 5; 1 LIQUID ORAL at 07:05

## 2021-03-07 RX ADMIN — Medication 81 MILLIGRAM(S): at 12:15

## 2021-03-07 RX ADMIN — Medication 75 MICROGRAM(S): at 06:26

## 2021-03-07 RX ADMIN — Medication 20 MILLIGRAM(S): at 06:28

## 2021-03-07 RX ADMIN — Medication 1 PACKET(S): at 06:26

## 2021-03-07 RX ADMIN — Medication 1 TABLET(S): at 12:15

## 2021-03-07 RX ADMIN — SODIUM CHLORIDE 3 MILLILITER(S): 9 INJECTION INTRAMUSCULAR; INTRAVENOUS; SUBCUTANEOUS at 06:28

## 2021-03-07 RX ADMIN — Medication 25 MILLIGRAM(S): at 06:26

## 2021-03-07 NOTE — DISCHARGE NOTE PROVIDER - NSDCCPTREATMENT_GEN_ALL_CORE_FT
PRINCIPAL PROCEDURE  Procedure: TAVR, open femoral artery approach  Findings and Treatment: Transfemoral Valve in Valve TAVR via Right Common Femoral Artery Cutdown (23mm CoreValve Evolut Pro+) (NCT# [id number]) (STS/ACC TVT Registry Patient ID# [id number])

## 2021-03-07 NOTE — PHYSICAL THERAPY INITIAL EVALUATION ADULT - PERTINENT HX OF CURRENT PROBLEM, REHAB EVAL
Per EMR: underwent a transfemoral valve in valve TAVR (corevalve) via [facility] cutdown with Dr. Denny on 3/5/2021. Intraop course significant for post deployment trace PVL and no conduction or rhythm changes.

## 2021-03-07 NOTE — PHYSICAL THERAPY INITIAL EVALUATION ADULT - GENERAL OBSERVATIONS, REHAB EVAL
Pt received in chair, + IV Loc, +Tele//BP monitoring, + NC O2, in NAD, in 5/10 right groin pain, agreeable to PT evaluation

## 2021-03-07 NOTE — DISCHARGE NOTE PROVIDER - NSDCMRMEDTOKEN_GEN_ALL_CORE_FT
aspirin 325 mg oral tablet: 1 tab(s) orally once a day  levothyroxine 75 mcg (0.075 mg) oral tablet: 1 tab(s) orally once a day  magnesium: orally once a day  metoprolol succinate 25 mg oral tablet, extended release: 1 tab(s) orally once a day  Multi Vitamin+: 1 tab(s) orally once a day  pantoprazole 40 mg oral delayed release tablet: 1 tab(s) orally once a day  Probiotic Formula oral capsule: 1 cap(s) orally once a day  senna oral tablet: 2 tab(s) orally once a day (at bedtime), As Needed  sildenafil 20 mg oral tablet: 1 tab(s) orally 3 times a day  simvastatin 20 mg oral tablet: 1 tab(s) orally once a day (at bedtime)  torsemide 10 mg oral tablet: 1 tab(s) orally once a day   aspirin 325 mg oral tablet: 1 tab(s) orally once a day  clopidogrel 75 mg oral tablet: 1 tab(s) orally once a day  levothyroxine 75 mcg (0.075 mg) oral tablet: 1 tab(s) orally once a day  metoprolol succinate 25 mg oral tablet, extended release: 1 tab(s) orally once a day  pantoprazole 40 mg oral delayed release tablet: 1 tab(s) orally once a day  Probiotic Formula oral capsule: 1 cap(s) orally once a day  senna oral tablet: 2 tab(s) orally once a day (at bedtime), As Needed  sildenafil 20 mg oral tablet: 1 tab(s) orally 3 times a day  simvastatin 20 mg oral tablet: 1 tab(s) orally once a day (at bedtime)   aspirin 81 mg oral tablet, chewable: 1 tab(s) orally once a day  clopidogrel 75 mg oral tablet: 1 tab(s) orally once a day  levothyroxine 75 mcg (0.075 mg) oral tablet: 1 tab(s) orally once a day  metoprolol succinate 25 mg oral tablet, extended release: 1 tab(s) orally once a day  pantoprazole 40 mg oral delayed release tablet: 1 tab(s) orally once a day  Probiotic Formula oral capsule: 1 cap(s) orally once a day  senna oral tablet: 2 tab(s) orally once a day (at bedtime), As Needed  sildenafil 20 mg oral tablet: 1 tab(s) orally 3 times a day  simvastatin 20 mg oral tablet: 1 tab(s) orally once a day (at bedtime)

## 2021-03-07 NOTE — DISCHARGE NOTE NURSING/CASE MANAGEMENT/SOCIAL WORK - PATIENT PORTAL LINK FT
You can access the FollowMyHealth Patient Portal offered by [facility] by registering at the following website: [email or web address]. By joining [employer]’s FollowMyHealth portal, you will also be able to view your health information using other applications (apps) compatible with our system.

## 2021-03-07 NOTE — DISCHARGE NOTE PROVIDER - NSDCFUADDAPPT_GEN_ALL_CORE_FT
Please follow up with Dr. Denny by calling the CT Surgery office at [phone] on the next open business day to make an appointment.     Please be ~ 30 mins early on the day of your appointment to have a chest xray (script is in your folder) taken on the first floor in radiology PRIOR to going to the CT surgery office.

## 2021-03-07 NOTE — DISCHARGE NOTE PROVIDER - PROVIDER TOKENS
FREE:[LAST:[Denisha],FIRST:[Stalin],PHONE:[phone]],FAX:[(   )    -],ADDRESS:[[address] [place] [address]],FOLLOWUP:[2 weeks]],FREE:[LAST:[Erinara],FIRST:[Benjamin],PHONE:[phone]],FAX:[(   )    -],ADDRESS:[[address] [place] [address]]] FREE:[LAST:[Denisha],FIRST:[Stalin],PHONE:[phone]],FAX:[(   )    -],ADDRESS:[[address] [place] [address]],FOLLOWUP:[2 weeks]],FREE:[LAST:[Korlipara],FIRST:[Benjamin],PHONE:[phone]],FAX:[(   )    -],ADDRESS:[[address] [place] [address]]],PROVIDER:[TOKEN:[[id number]],FOLLOWUP:[1 week]]

## 2021-03-07 NOTE — DISCHARGE NOTE PROVIDER - NSDCCPCAREPLAN_GEN_ALL_CORE_FT
PRINCIPAL DISCHARGE DIAGNOSIS  Diagnosis: Nonrheumatic aortic valve insufficiency  Assessment and Plan of Treatment: Bioprosthetic valve AI  - Keep the groin site clean and dry.  You may shower and pat the site dry.  - Watch the site for signs of redness or drainage, these should be reported to your doctor immediately.  - You will receive a wallet card about your new valve in the mail.  Please carry it with you to present to anyone who may ask if you have any medical implants.  - Be sure to inform your doctors including your dentist about your valve since you will need to take antibiotics to reduce the risk of infection before certain medical and dental procedures.  - You will be given an appointment to follow up with your doctor in approximately 4 weeks.  It is important to keep this appointment so that your new valve can be assessed.  You will be discharged with a heart rythm monitoring device called an "MCOT" that will be with your for 30 days.   Make sure to follow the insutrctions provided in the hospital.   Reminders: You can shower with the device. Charge the phone every other day for an hour. Change the patch every 5 days and charge your monitor at the same time. Do not charge devices overnight. If you need to charge the devices sooner than recommended, you will get an alarm on the devices to indicate you to do so. Carry these devices with you at ALL times.   - You may resume all your normal activities.      SECONDARY DISCHARGE DIAGNOSES  Diagnosis: Chronic kidney disease (CKD), stage IV (severe)  Assessment and Plan of Treatment: Chronic kidney disease (CKD), stage IV (severe)

## 2021-03-07 NOTE — DISCHARGE NOTE PROVIDER - CARE PROVIDER_API CALL
Stalin Denny  [address] [place] [address]  Phone: [phone]  Fax: (   )    -  Follow Up Time: 2 weeks    Benjamin Wolfe  [address] [place] [address]  Phone: [phone]  Fax: (   )    -  Follow Up Time:    Stalin Denny  [address] [place] [address]  Phone: [phone]  Fax: (   )    -  Follow Up Time: 2 weeks    Benjamin Wolfe  [address] [place] [address]  Phone: [phone]  Fax: (   )    -  Follow Up Time:     JIMMY VILLA  [id number]  [address]  [place] [address]  Phone: ()-  Fax: ()-  Follow Up Time: 1 week

## 2021-03-07 NOTE — DISCHARGE NOTE NURSING/CASE MANAGEMENT/SOCIAL WORK - NSDCFUADDAPPT_GEN_ALL_CORE_FT
Please follow up with Dr. Denny by calling the CT Surgery office at [phone] on the next open business day to make an appointment.     Please be ~ 30 mins early on the day of your appointment to have a chest xray (script is in your folder) taken on the first floor in radiology PRIOR to going to the CT surgery office.

## 2021-03-07 NOTE — DISCHARGE NOTE NURSING/CASE MANAGEMENT/SOCIAL WORK - NSDCPEPT PROEDHF_GEN_ALL_CORE
Awake/Alert Call primary care provider for follow up after discharge/Activities as tolerated/Low salt diet/Monitor weight daily/Report signs and symptoms to primary care provider

## 2021-03-07 NOTE — PHYSICAL THERAPY INITIAL EVALUATION ADULT - ADDITIONAL COMMENTS
Pt reports living in a private home with 1 small step to enter with no handrails, no stairs inside. Pt lives alone, reports she's pretty independent doesn't need any assist. Pt owns and use RW/SAC, states RW provides more stability. Pt also has home oxygen that she uses predominately at night.

## 2021-03-07 NOTE — DISCHARGE NOTE PROVIDER - NSDCFUADDINST_GEN_ALL_CORE_FT
Please call the Cardiothoracic Surgery office at [phone] if you are experiencing any shortness of breath, chest pain, fevers or chills, drainage from the incisions, persistent nausea, vomiting or if you have any questions about your medications. If the symptoms are severe, call 911 and go to the nearest hospital.

## 2021-03-07 NOTE — DISCHARGE NOTE PROVIDER - HOSPITAL COURSE
(Recently admitted @ [facility] 2/22/21-2/25/21 for Acute on Chronic Diastolic CHF), Sev AS/CAD s/p AVR(T) C1L (12/16/10 @ [facility]) now with Severe Bioprosthetic AI / Failed Surgical Valve, HTN, HLD, Baseline 1st degree AVB, PE (? 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      3/5 admitted through [facility], post deployment trace PVL, no conduction or rhythm changes, extubated in the OR      < from: TTE Echo Complete w/o Contrast w/ Doppler (03.05.21 @ 16:13) >    Summary:   1. Endocardial visualization was enhanced with intravenous echo contrast.   2. Left ventricular ejection fraction, by visual estimation, is 45 to 50%.   3. Mildly decreased global left ventricular systolic function.   4. There is mild concentric left ventricular hypertrophy.   5. Abnormal septal motion consistent with post-operative status.   6. Spectral Doppler shows impaired relaxation pattern of left ventricular myocardial filling (Grade I diastolic dysfunction).   7. Normal RV size and function, estimated PASP 43 mmHg mildly elevated.   8. Mildly enlarged left atrium.   9. Mild mitral annular calcification.  10. Mild to moderate eccentric mitral valve regurgitation.  11. Mild tricuspid regurgitation.  12. CoreValve TAVR present with peak velocity 2.8 m/s, peak gradient is 30.5 mmHg and the mean gradient is 18.0 mmHg, dimensionless index 0.39, which is probably normal in the setting of a prosthetic aortic valve. No evidence paravalvular regurgitation.  13. Mean mitral valve gradient 3 mmHg (at HR of 73 bpm).  14. There is no evidence of pericardial effusion.  15. Compared to the prior TTE study from 2/23/21, LV systolic function/EF have mildly reduced, interval TAVR placement.    < end of copied text >     (Recently admitted @ [facility] 2/22/21-2/25/21 for Acute on Chronic Diastolic CHF), Sev AS/CAD s/p AVR(T) C1L (12/16/10 @ [facility]) now with Severe Bioprosthetic AI / Failed Surgical Valve, HTN, HLD, Baseline 1st degree AVB, PE (? 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      3/5 admitted through [facility], post deployment trace PVL, no conduction or rhythm changes, extubated in the OR. 3/7: Cleared for discharge. Renal function improving.     PE  Gen: NAD, A+O x4, sitting up in chair  Neuro: No gross deficits, CN II- XII grossly intact.  CV: S1 + S2 intact, RRR  Pulm: no accessory muscle use, CTA BL  Abd: soft, NT, groins: soft, Right- no drainage from surgical site, no hematoma  Vasc: ext warm, soft, no edema. BL, 2+ FP, DP BL          < from: TTE Echo Complete w/o Contrast w/ Doppler (03.05.21 @ 16:13) >    Summary:   1. Endocardial visualization was enhanced with intravenous echo contrast.   2/. Left ventricular ejection fraction, by visual estimation, is 45 to 50%.   3. Mildly decreased global left ventricular systolic function.   4. There is mild concentric left ventricular hypertrophy.   5. Abnormal septal motion consistent with post-operative status.   6. Spectral Doppler shows impaired relaxation pattern of left ventricular myocardial filling (Grade I diastolic dysfunction).   7. Normal RV size and function, estimated PASP 43 mmHg mildly elevated.   8. Mildly enlarged left atrium.   9. Mild mitral annular calcification.  10. Mild to moderate eccentric mitral valve regurgitation.  11. Mild tricuspid regurgitation.  12. CoreValve TAVR present with peak velocity 2.8 m/s, peak gradient is 30.5 mmHg and the mean gradient is 18.0 mmHg, dimensionless index 0.39, which is probably normal in the setting of a prosthetic aortic valve. No evidence paravalvular regurgitation.  13. Mean mitral valve gradient 3 mmHg (at HR of 73 bpm).  14. There is no evidence of pericardial effusion.  15. Compared to the prior TTE study from 2/23/21, LV systolic function/EF have mildly reduced, interval TAVR placement.    < end of copied text >

## 2021-03-08 ENCOUNTER — RESULT REVIEW (OUTPATIENT)
Age: 86
End: 2021-03-08

## 2021-03-08 ENCOUNTER — NON-APPOINTMENT (OUTPATIENT)
Age: 86
End: 2021-03-08

## 2021-03-08 ENCOUNTER — INPATIENT (INPATIENT)
Facility: HOSPITAL | Age: 86
LOS: 1 days | Discharge: HOME | DRG: 300 | End: 2021-03-10
Attending: THORACIC SURGERY (CARDIOTHORACIC VASCULAR SURGERY) | Admitting: THORACIC SURGERY (CARDIOTHORACIC VASCULAR SURGERY)
Payer: MEDICARE

## 2021-03-08 ENCOUNTER — OUTPATIENT (OUTPATIENT)
Dept: OUTPATIENT SERVICES | Facility: HOSPITAL | Age: 86
LOS: 1 days | End: 2021-03-08
Payer: MEDICARE

## 2021-03-08 ENCOUNTER — APPOINTMENT (OUTPATIENT)
Dept: CARDIOTHORACIC SURGERY | Facility: CLINIC | Age: 86
End: 2021-03-08
Payer: MEDICARE

## 2021-03-08 VITALS
OXYGEN SATURATION: 93 % | DIASTOLIC BLOOD PRESSURE: 98 MMHG | WEIGHT: 133 LBS | BODY MASS INDEX: 24.48 KG/M2 | RESPIRATION RATE: 18 BRPM | SYSTOLIC BLOOD PRESSURE: 183 MMHG | HEIGHT: 62 IN

## 2021-03-08 VITALS
SYSTOLIC BLOOD PRESSURE: 140 MMHG | HEIGHT: 61 IN | OXYGEN SATURATION: 95 % | TEMPERATURE: 98 F | HEART RATE: 82 BPM | DIASTOLIC BLOOD PRESSURE: 83 MMHG | RESPIRATION RATE: 20 BRPM | WEIGHT: 132.94 LBS

## 2021-03-08 DIAGNOSIS — R06.00 DYSPNEA, UNSPECIFIED: ICD-10-CM

## 2021-03-08 DIAGNOSIS — S30.1XXA CONTUSION OF ABDOMINAL WALL, INITIAL ENCOUNTER: ICD-10-CM

## 2021-03-08 DIAGNOSIS — I35.1 NONRHEUMATIC AORTIC (VALVE) INSUFFICIENCY: ICD-10-CM

## 2021-03-08 DIAGNOSIS — I82.411 ACUTE EMBOLISM AND THROMBOSIS OF RIGHT FEMORAL VEIN: ICD-10-CM

## 2021-03-08 DIAGNOSIS — Q74.2 OTHER CONGENITAL MALFORMATIONS OF LOWER LIMB(S), INCLUDING PELVIC GIRDLE: ICD-10-CM

## 2021-03-08 DIAGNOSIS — N18.9 CHRONIC KIDNEY DISEASE, UNSPECIFIED: ICD-10-CM

## 2021-03-08 DIAGNOSIS — I80.10 PHLEBITIS AND THROMBOPHLEBITIS OF UNSPECIFIED FEMORAL VEIN: ICD-10-CM

## 2021-03-08 DIAGNOSIS — Z95.2 PRESENCE OF PROSTHETIC HEART VALVE: Chronic | ICD-10-CM

## 2021-03-08 DIAGNOSIS — I35.0 NONRHEUMATIC AORTIC (VALVE) STENOSIS: ICD-10-CM

## 2021-03-08 DIAGNOSIS — Z98.890 OTHER SPECIFIED POSTPROCEDURAL STATES: Chronic | ICD-10-CM

## 2021-03-08 DIAGNOSIS — I25.10 ATHEROSCLEROTIC HEART DISEASE OF NATIVE CORONARY ARTERY WITHOUT ANGINA PECTORIS: ICD-10-CM

## 2021-03-08 DIAGNOSIS — E78.5 HYPERLIPIDEMIA, UNSPECIFIED: ICD-10-CM

## 2021-03-08 DIAGNOSIS — I50.9 HEART FAILURE, UNSPECIFIED: ICD-10-CM

## 2021-03-08 LAB
ALBUMIN SERPL ELPH-MCNC: 3.8 G/DL — SIGNIFICANT CHANGE UP (ref 3.3–5.2)
ALP SERPL-CCNC: 46 U/L — SIGNIFICANT CHANGE UP (ref 40–120)
ALT FLD-CCNC: 10 U/L — SIGNIFICANT CHANGE UP
ANION GAP SERPL CALC-SCNC: 12 MMOL/L — SIGNIFICANT CHANGE UP (ref 5–17)
APTT BLD: 26.8 SEC — LOW (ref 27.5–35.5)
AST SERPL-CCNC: 22 U/L — SIGNIFICANT CHANGE UP
BASOPHILS # BLD AUTO: 0.09 K/UL — SIGNIFICANT CHANGE UP (ref 0–0.2)
BASOPHILS NFR BLD AUTO: 0.7 % — SIGNIFICANT CHANGE UP (ref 0–2)
BILIRUB SERPL-MCNC: 0.5 MG/DL — SIGNIFICANT CHANGE UP (ref 0.4–2)
BLD GP AB SCN SERPL QL: SIGNIFICANT CHANGE UP
BUN SERPL-MCNC: 40 MG/DL — HIGH (ref 8–20)
CALCIUM SERPL-MCNC: 9.5 MG/DL — SIGNIFICANT CHANGE UP (ref 8.6–10.2)
CHLORIDE SERPL-SCNC: 102 MMOL/L — SIGNIFICANT CHANGE UP (ref 98–107)
CO2 SERPL-SCNC: 23 MMOL/L — SIGNIFICANT CHANGE UP (ref 22–29)
CREAT SERPL-MCNC: 1.46 MG/DL — HIGH (ref 0.5–1.3)
EOSINOPHIL # BLD AUTO: 0.42 K/UL — SIGNIFICANT CHANGE UP (ref 0–0.5)
EOSINOPHIL NFR BLD AUTO: 3.1 % — SIGNIFICANT CHANGE UP (ref 0–6)
GLUCOSE SERPL-MCNC: 88 MG/DL — SIGNIFICANT CHANGE UP (ref 70–99)
HCT VFR BLD CALC: 26.8 % — LOW (ref 34.5–45)
HGB BLD-MCNC: 8.5 G/DL — LOW (ref 11.5–15.5)
IMM GRANULOCYTES NFR BLD AUTO: 0.3 % — SIGNIFICANT CHANGE UP (ref 0–1.5)
INR BLD: 1.05 RATIO — SIGNIFICANT CHANGE UP (ref 0.88–1.16)
LYMPHOCYTES # BLD AUTO: 0.96 K/UL — LOW (ref 1–3.3)
LYMPHOCYTES # BLD AUTO: 7.2 % — LOW (ref 13–44)
MCHC RBC-ENTMCNC: 28.5 PG — SIGNIFICANT CHANGE UP (ref 27–34)
MCHC RBC-ENTMCNC: 31.7 GM/DL — LOW (ref 32–36)
MCV RBC AUTO: 89.9 FL — SIGNIFICANT CHANGE UP (ref 80–100)
MONOCYTES # BLD AUTO: 1.62 K/UL — HIGH (ref 0–0.9)
MONOCYTES NFR BLD AUTO: 12.1 % — SIGNIFICANT CHANGE UP (ref 2–14)
NEUTROPHILS # BLD AUTO: 10.21 K/UL — HIGH (ref 1.8–7.4)
NEUTROPHILS NFR BLD AUTO: 76.6 % — SIGNIFICANT CHANGE UP (ref 43–77)
PLATELET # BLD AUTO: 261 K/UL — SIGNIFICANT CHANGE UP (ref 150–400)
POTASSIUM SERPL-MCNC: 4.7 MMOL/L — SIGNIFICANT CHANGE UP (ref 3.5–5.3)
POTASSIUM SERPL-SCNC: 4.7 MMOL/L — SIGNIFICANT CHANGE UP (ref 3.5–5.3)
PROT SERPL-MCNC: 6.6 G/DL — SIGNIFICANT CHANGE UP (ref 6.6–8.7)
PROTHROM AB SERPL-ACNC: 12.1 SEC — SIGNIFICANT CHANGE UP (ref 10.6–13.6)
RBC # BLD: 2.98 M/UL — LOW (ref 3.8–5.2)
RBC # FLD: 15.9 % — HIGH (ref 10.3–14.5)
SARS-COV-2 RNA SPEC QL NAA+PROBE: SIGNIFICANT CHANGE UP
SODIUM SERPL-SCNC: 137 MMOL/L — SIGNIFICANT CHANGE UP (ref 135–145)
WBC # BLD: 13.34 K/UL — HIGH (ref 3.8–10.5)
WBC # FLD AUTO: 13.34 K/UL — HIGH (ref 3.8–10.5)

## 2021-03-08 PROCEDURE — 71045 X-RAY EXAM CHEST 1 VIEW: CPT | Mod: 26

## 2021-03-08 PROCEDURE — 93010 ELECTROCARDIOGRAM REPORT: CPT

## 2021-03-08 PROCEDURE — 99285 EMERGENCY DEPT VISIT HI MDM: CPT | Mod: CS

## 2021-03-08 PROCEDURE — 99213 OFFICE O/P EST LOW 20 MIN: CPT

## 2021-03-08 PROCEDURE — 93926 LOWER EXTREMITY STUDY: CPT | Mod: 26,RT

## 2021-03-08 RX ORDER — LEVOTHYROXINE SODIUM 125 MCG
75 TABLET ORAL DAILY
Refills: 0 | Status: DISCONTINUED | OUTPATIENT
Start: 2021-03-08 | End: 2021-03-10

## 2021-03-08 RX ORDER — SENNA PLUS 8.6 MG/1
2 TABLET ORAL AT BEDTIME
Refills: 0 | Status: DISCONTINUED | OUTPATIENT
Start: 2021-03-08 | End: 2021-03-10

## 2021-03-08 RX ORDER — PANTOPRAZOLE SODIUM 20 MG/1
40 TABLET, DELAYED RELEASE ORAL
Refills: 0 | Status: DISCONTINUED | OUTPATIENT
Start: 2021-03-08 | End: 2021-03-09

## 2021-03-08 RX ORDER — APIXABAN 2.5 MG/1
2.5 TABLET, FILM COATED ORAL
Refills: 0 | Status: DISCONTINUED | OUTPATIENT
Start: 2021-03-08 | End: 2021-03-10

## 2021-03-08 RX ORDER — SIMVASTATIN 20 MG/1
20 TABLET, FILM COATED ORAL AT BEDTIME
Refills: 0 | Status: DISCONTINUED | OUTPATIENT
Start: 2021-03-08 | End: 2021-03-10

## 2021-03-08 RX ORDER — SODIUM CHLORIDE 9 MG/ML
3 INJECTION INTRAMUSCULAR; INTRAVENOUS; SUBCUTANEOUS EVERY 8 HOURS
Refills: 0 | Status: DISCONTINUED | OUTPATIENT
Start: 2021-03-08 | End: 2021-03-10

## 2021-03-08 RX ORDER — ASPIRIN/CALCIUM CARB/MAGNESIUM 324 MG
81 TABLET ORAL DAILY
Refills: 0 | Status: DISCONTINUED | OUTPATIENT
Start: 2021-03-08 | End: 2021-03-10

## 2021-03-08 RX ORDER — CLOPIDOGREL BISULFATE 75 MG/1
75 TABLET, FILM COATED ORAL DAILY
Refills: 0 | Status: DISCONTINUED | OUTPATIENT
Start: 2021-03-08 | End: 2021-03-09

## 2021-03-08 RX ORDER — METOPROLOL TARTRATE 50 MG
25 TABLET ORAL DAILY
Refills: 0 | Status: DISCONTINUED | OUTPATIENT
Start: 2021-03-08 | End: 2021-03-10

## 2021-03-08 RX ADMIN — SIMVASTATIN 20 MILLIGRAM(S): 20 TABLET, FILM COATED ORAL at 21:36

## 2021-03-08 RX ADMIN — SODIUM CHLORIDE 3 MILLILITER(S): 9 INJECTION INTRAMUSCULAR; INTRAVENOUS; SUBCUTANEOUS at 21:17

## 2021-03-08 RX ADMIN — Medication 20 MILLIGRAM(S): at 21:36

## 2021-03-08 RX ADMIN — SENNA PLUS 2 TABLET(S): 8.6 TABLET ORAL at 21:36

## 2021-03-08 NOTE — ED PROVIDER NOTE - NS ED ATTENDING STATEMENT MOD
NEPHROLOGY / [place] / [place]    ·  As our patient we look forward to providing you with the best quality of care and         helping to facilitate your needs.    · On your way out, you may schedule your next appointment at the reception desk where you checked in at, or you can call us back at either of the locations noted below, to schedule with Dr. Anderson Ervin, in the Nephrology Dept. (kidney doctor)    · Your next appointment with Dr. Barron is due in:1 year    · Your NON-FASTING LABS will be due few days prior to your next office visit. These orders will be entered in the system. You may present to any [facility] lab for your tests, no appointment is necessary *Results of these tests will then be given to you at your next scheduled office visit with     · For any tests that you may need, please see: \"Non-Medication Orders\"section  __________________________________________________________________________________________    *Appointment hours for Dr. Barron at both locations below are 12:30pm to 3:50pm.  *Please note we are out of the office on Friday, Saturday, and Sunday    [facility]    (Tues., Wed., & Thurs.)  [address] [place] [address]   Phone: [phone]   Fax: [phone]    [facility]  (Monday's only)  [address], [address]  *[place] [address]  Phone: [phone]  Fax: [phone]    Additional Educational Resources:  For additional resources regarding your symptoms, diagnosis, or further health information, please visit the Health Resources section on [email or web address] or the Online Health Resources section in [facility].      
Attending Only

## 2021-03-08 NOTE — ED ADULT NURSE NOTE - COVID-19 RESULT
Pharmacy called says that pt has been on the generic Ambien and would like to continue taking generic instead of brand name.    Please call [facility] at: [phone]   NEGATIVE

## 2021-03-08 NOTE — H&P ADULT - NSHPLABSRESULTS_GEN_ALL_CORE
CBC Full  -  ( 08 Mar 2021 18:25 )  WBC Count : 13.34 K/uL  RBC Count : 2.98 M/uL  Hemoglobin : 8.5 g/dL  Hematocrit : 26.8 %  Platelet Count - Automated : 261 K/uL  Mean Cell Volume : 89.9 fl  Mean Cell Hemoglobin : 28.5 pg  Mean Cell Hemoglobin Concentration : 31.7 gm/dL  Auto Neutrophil # : 10.21 K/uL  Auto Lymphocyte # : 0.96 K/uL  Auto Monocyte # : 1.62 K/uL  Auto Eosinophil # : 0.42 K/uL  Auto Basophil # : 0.09 K/uL  Auto Neutrophil % : 76.6 %  Auto Lymphocyte % : 7.2 %  Auto Monocyte % : 12.1 %  Auto Eosinophil % : 3.1 %  Auto Basophil % : 0.7 %    03-08    137  |  102  |  40.0<H>  ----------------------------<  88  4.7   |  23.0  |  1.46<H>    Ca    9.5      08 Mar 2021 18:25  Mg     2.3     03-07    TPro  6.6  /  Alb  3.8  /  TBili  0.5  /  DBili  x   /  AST  22  /  ALT  10  /  AlkPhos  46  03-08

## 2021-03-08 NOTE — ED PROVIDER NOTE - OBJECTIVE STATEMENT
Patient is a 89 year old female with history of CAD, CKH, recent TAVR presenting with persistent oozing from right groin cath site. Seen by her CT surgeon who obtained dopplar which showed right LE DVT. pt denies any chest pain, sob, syncope, fevers, chills, cough, abd pain, worsening groin pain. Sent in for admission for further workup. No new trauma. Pt claims to be complaint with all meds.

## 2021-03-08 NOTE — H&P ADULT - EXTREMITIES COMMENTS
RT groin cutdown site c/d/i with small 2x3cm firm hematoma just inferior to incision. No evidence of active bleeding or infection.

## 2021-03-08 NOTE — H&P ADULT - PROBLEM SELECTOR PLAN 2
RT groin with small hematoma. No need for any surgical intervention at this time. Monitor RT groin closely while admitted and after AC starts.

## 2021-03-08 NOTE — ED ADULT TRIAGE NOTE - CHIEF COMPLAINT QUOTE
Patient brought in by ambulance A/Ox3, had TAVR on 3/5 was bleeding from surgical site today advised to come to ED.

## 2021-03-08 NOTE — ED ADULT NURSE NOTE - NSIMPLEMENTINTERV_GEN_ALL_ED
Implemented All Fall Risk Interventions:  [place] to call system. Call bell, personal items and telephone within reach. Instruct patient to call for assistance. Room bathroom lighting operational. Non-slip footwear when patient is off stretcher. Physically safe environment: no spills, clutter or unnecessary equipment. Stretcher in lowest position, wheels locked, appropriate side rails in place. Provide visual cue, wrist band, yellow gown, etc. Monitor gait and stability. Monitor for mental status changes and reorient to person, place, and time. Review medications for side effects contributing to fall risk. Reinforce activity limits and safety measures with patient and family.

## 2021-03-08 NOTE — H&P ADULT - NSICDXPASTMEDICALHX_GEN_ALL_CORE_FT
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

## 2021-03-08 NOTE — ED ADULT NURSE NOTE - OBJECTIVE STATEMENT
pt alert and oriented x4 came in c/o bleeding to TAVR site. pt reports she had procedure 3/5 with suden onset bleeding, was sent for US and told to come to ED For possible clot and hematoma. was recently started on plavix post procedure. RR even unlabored. gauze over incision. bleeding noted, controlled at this time. pt educated on plan of care, pt able to successfully teach back plan of care to RN, RN will continue to reeducate pt during hospital stay.

## 2021-03-08 NOTE — H&P ADULT - PROBLEM SELECTOR PLAN 1
S/p valve in valve TAVR. on 3/5. TTE demonstrating well-seated functional valve.  Monitor Vital signs.  Monitor I+Os.  No need for TTE at this time.   Continue ASA/Plavix  Transfer to 4T[facility] pending negative COVID Swab

## 2021-03-08 NOTE — H&P ADULT - HISTORY OF PRESENT ILLNESS
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 Denny on 3/5/2021, discharged on 3/7/21. Seen in CT surgery office on 3/8 after noticing oozing from RT groin at home. Patient was sent for US of RT groin which revealed a hematoma as well as a RT femoral vein DVT. Patient to be admitted to CT surgery service for management of RT groin hematoma and DVT. At present patient has no complaints. Denies CP, SOB, HA, Dizziness, n/v/d, abd pain, arm pain, leg pain, numbness, tingling.     ROS negative x10 except as noted above.

## 2021-03-08 NOTE — ED PROVIDER NOTE - MUSCULOSKELETAL, MLM
Spine appears normal, range of motion is not limited, no muscle or joint tenderness No LE pain or swelling

## 2021-03-08 NOTE — H&P ADULT - PROBLEM SELECTOR PLAN 3
New DVT of RT femoral and GSV. Plan to anticoagulate with renally dosed eliquis upon arrival to 4T[facility].

## 2021-03-08 NOTE — ED PROVIDER NOTE - SKIN, MLM
Skin normal color for race, warm, dry and intact. No evidence of rash. +oozing blood from cath site in groin.

## 2021-03-08 NOTE — H&P ADULT - ASSESSMENT
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 Denny on 3/5/2021, discharged on 3/7/21. Seen in CT surgery office on 3/8 after noticing oozing from RT groin at home. Patient was sent for US of RT groin which revealed a hematoma as well as a RT femoral vein DVT. Patient to be admitted to CT surgery service for management of RT groin hematoma and DVT.

## 2021-03-08 NOTE — H&P ADULT - NSICDXPASTSURGICALHX_GEN_ALL_CORE_FT
PAST SURGICAL HISTORY:  H/O aortic valve replacement 21mm Billy Nunez (2010)    H/O hernia repair     S/P TAVR (transcatheter aortic valve replacement)

## 2021-03-08 NOTE — ED PROVIDER NOTE - CLINICAL SUMMARY MEDICAL DECISION MAKING FREE TEXT BOX
pt presenting with dvt and oozing from cath site. ct surg requests admission and to hold AC this time

## 2021-03-09 DIAGNOSIS — Z29.9 ENCOUNTER FOR PROPHYLACTIC MEASURES, UNSPECIFIED: ICD-10-CM

## 2021-03-09 LAB
ALBUMIN SERPL ELPH-MCNC: 3.6 G/DL — SIGNIFICANT CHANGE UP (ref 3.3–5.2)
ALP SERPL-CCNC: 44 U/L — SIGNIFICANT CHANGE UP (ref 40–120)
ALT FLD-CCNC: 8 U/L — SIGNIFICANT CHANGE UP
ANION GAP SERPL CALC-SCNC: 11 MMOL/L — SIGNIFICANT CHANGE UP (ref 5–17)
ANISOCYTOSIS BLD QL: SLIGHT — SIGNIFICANT CHANGE UP
AST SERPL-CCNC: 17 U/L — SIGNIFICANT CHANGE UP
BASOPHILS # BLD AUTO: 0.17 K/UL — SIGNIFICANT CHANGE UP (ref 0–0.2)
BASOPHILS NFR BLD AUTO: 1.8 % — SIGNIFICANT CHANGE UP (ref 0–2)
BILIRUB SERPL-MCNC: 0.5 MG/DL — SIGNIFICANT CHANGE UP (ref 0.4–2)
BUN SERPL-MCNC: 36 MG/DL — HIGH (ref 8–20)
CALCIUM SERPL-MCNC: 9.4 MG/DL — SIGNIFICANT CHANGE UP (ref 8.6–10.2)
CHLORIDE SERPL-SCNC: 103 MMOL/L — SIGNIFICANT CHANGE UP (ref 98–107)
CO2 SERPL-SCNC: 25 MMOL/L — SIGNIFICANT CHANGE UP (ref 22–29)
CREAT SERPL-MCNC: 1.26 MG/DL — SIGNIFICANT CHANGE UP (ref 0.5–1.3)
ELLIPTOCYTES BLD QL SMEAR: SLIGHT — SIGNIFICANT CHANGE UP
EOSINOPHIL # BLD AUTO: 0.25 K/UL — SIGNIFICANT CHANGE UP (ref 0–0.5)
EOSINOPHIL NFR BLD AUTO: 2.6 % — SIGNIFICANT CHANGE UP (ref 0–6)
GIANT PLATELETS BLD QL SMEAR: PRESENT — SIGNIFICANT CHANGE UP
GLUCOSE SERPL-MCNC: 81 MG/DL — SIGNIFICANT CHANGE UP (ref 70–99)
HCT VFR BLD CALC: 27.4 % — LOW (ref 34.5–45)
HGB BLD-MCNC: 8.3 G/DL — LOW (ref 11.5–15.5)
LYMPHOCYTES # BLD AUTO: 0.68 K/UL — LOW (ref 1–3.3)
LYMPHOCYTES # BLD AUTO: 7 % — LOW (ref 13–44)
MANUAL SMEAR VERIFICATION: SIGNIFICANT CHANGE UP
MCHC RBC-ENTMCNC: 27.7 PG — SIGNIFICANT CHANGE UP (ref 27–34)
MCHC RBC-ENTMCNC: 30.3 GM/DL — LOW (ref 32–36)
MCV RBC AUTO: 91.3 FL — SIGNIFICANT CHANGE UP (ref 80–100)
MONOCYTES # BLD AUTO: 1.19 K/UL — HIGH (ref 0–0.9)
MONOCYTES NFR BLD AUTO: 12.3 % — SIGNIFICANT CHANGE UP (ref 2–14)
NEUTROPHILS # BLD AUTO: 7.13 K/UL — SIGNIFICANT CHANGE UP (ref 1.8–7.4)
NEUTROPHILS NFR BLD AUTO: 73.7 % — SIGNIFICANT CHANGE UP (ref 43–77)
OVALOCYTES BLD QL SMEAR: SLIGHT — SIGNIFICANT CHANGE UP
PLAT MORPH BLD: NORMAL — SIGNIFICANT CHANGE UP
PLATELET # BLD AUTO: 242 K/UL — SIGNIFICANT CHANGE UP (ref 150–400)
POIKILOCYTOSIS BLD QL AUTO: SLIGHT — SIGNIFICANT CHANGE UP
POLYCHROMASIA BLD QL SMEAR: SLIGHT — SIGNIFICANT CHANGE UP
POTASSIUM SERPL-MCNC: 4.2 MMOL/L — SIGNIFICANT CHANGE UP (ref 3.5–5.3)
POTASSIUM SERPL-SCNC: 4.2 MMOL/L — SIGNIFICANT CHANGE UP (ref 3.5–5.3)
PROT SERPL-MCNC: 6.3 G/DL — LOW (ref 6.6–8.7)
RBC # BLD: 3 M/UL — LOW (ref 3.8–5.2)
RBC # FLD: 15.8 % — HIGH (ref 10.3–14.5)
RBC BLD AUTO: ABNORMAL
SARS-COV-2 IGG SERPL QL IA: NEGATIVE — SIGNIFICANT CHANGE UP
SARS-COV-2 IGM SERPL IA-ACNC: 0.07 INDEX — SIGNIFICANT CHANGE UP
SARS-COV-2 N GENE NPH QL NAA+PROBE: NOT DETECTED
SCHISTOCYTES BLD QL AUTO: SLIGHT — SIGNIFICANT CHANGE UP
SODIUM SERPL-SCNC: 139 MMOL/L — SIGNIFICANT CHANGE UP (ref 135–145)
VARIANT LYMPHS # BLD: 2.6 % — SIGNIFICANT CHANGE UP (ref 0–6)
WBC # BLD: 9.67 K/UL — SIGNIFICANT CHANGE UP (ref 3.8–10.5)
WBC # FLD AUTO: 9.67 K/UL — SIGNIFICANT CHANGE UP (ref 3.8–10.5)

## 2021-03-09 PROCEDURE — 71045 X-RAY EXAM CHEST 1 VIEW: CPT | Mod: 26

## 2021-03-09 PROCEDURE — 99232 SBSQ HOSP IP/OBS MODERATE 35: CPT

## 2021-03-09 RX ORDER — SIMETHICONE 80 MG/1
80 TABLET, CHEWABLE ORAL ONCE
Refills: 0 | Status: COMPLETED | OUTPATIENT
Start: 2021-03-09 | End: 2021-03-09

## 2021-03-09 RX ORDER — PANTOPRAZOLE SODIUM 20 MG/1
40 TABLET, DELAYED RELEASE ORAL
Refills: 0 | Status: DISCONTINUED | OUTPATIENT
Start: 2021-03-09 | End: 2021-03-10

## 2021-03-09 RX ORDER — PSYLLIUM SEED (WITH DEXTROSE)
1 POWDER (GRAM) ORAL THREE TIMES A DAY
Refills: 0 | Status: DISCONTINUED | OUTPATIENT
Start: 2021-03-09 | End: 2021-03-10

## 2021-03-09 RX ADMIN — Medication 100 MILLIGRAM(S): at 22:26

## 2021-03-09 RX ADMIN — APIXABAN 2.5 MILLIGRAM(S): 2.5 TABLET, FILM COATED ORAL at 01:07

## 2021-03-09 RX ADMIN — Medication 75 MICROGRAM(S): at 05:54

## 2021-03-09 RX ADMIN — SODIUM CHLORIDE 3 MILLILITER(S): 9 INJECTION INTRAMUSCULAR; INTRAVENOUS; SUBCUTANEOUS at 17:50

## 2021-03-09 RX ADMIN — PANTOPRAZOLE SODIUM 40 MILLIGRAM(S): 20 TABLET, DELAYED RELEASE ORAL at 05:54

## 2021-03-09 RX ADMIN — Medication 20 MILLIGRAM(S): at 21:25

## 2021-03-09 RX ADMIN — SIMETHICONE 80 MILLIGRAM(S): 80 TABLET, CHEWABLE ORAL at 22:26

## 2021-03-09 RX ADMIN — Medication 1 PACKET(S): at 13:11

## 2021-03-09 RX ADMIN — Medication 25 MILLIGRAM(S): at 05:54

## 2021-03-09 RX ADMIN — SODIUM CHLORIDE 3 MILLILITER(S): 9 INJECTION INTRAMUSCULAR; INTRAVENOUS; SUBCUTANEOUS at 05:51

## 2021-03-09 RX ADMIN — SIMVASTATIN 20 MILLIGRAM(S): 20 TABLET, FILM COATED ORAL at 21:26

## 2021-03-09 RX ADMIN — Medication 81 MILLIGRAM(S): at 13:11

## 2021-03-09 RX ADMIN — Medication 1 PACKET(S): at 21:26

## 2021-03-09 RX ADMIN — Medication 20 MILLIGRAM(S): at 05:54

## 2021-03-09 RX ADMIN — SENNA PLUS 2 TABLET(S): 8.6 TABLET ORAL at 21:26

## 2021-03-09 RX ADMIN — APIXABAN 2.5 MILLIGRAM(S): 2.5 TABLET, FILM COATED ORAL at 17:55

## 2021-03-09 RX ADMIN — Medication 20 MILLIGRAM(S): at 13:11

## 2021-03-09 RX ADMIN — SODIUM CHLORIDE 3 MILLILITER(S): 9 INJECTION INTRAMUSCULAR; INTRAVENOUS; SUBCUTANEOUS at 22:27

## 2021-03-09 NOTE — PATIENT PROFILE ADULT - NSPROHMSYMPCOND_GEN_A_NUR
Telephone interview with pt, instructed pt on pre-op instructions/teaching, tips for safer surgery, pain management scale, covid swab done resulted - negative. pt verbalized understanding of all instructions given. Pt reports uses O2 2L/M HS and PRN during the day./cardiovascular/genitourinary

## 2021-03-10 ENCOUNTER — TRANSCRIPTION ENCOUNTER (OUTPATIENT)
Age: 86
End: 2021-03-10

## 2021-03-10 VITALS — HEART RATE: 88 BPM | DIASTOLIC BLOOD PRESSURE: 92 MMHG | SYSTOLIC BLOOD PRESSURE: 143 MMHG

## 2021-03-10 LAB
ALBUMIN SERPL ELPH-MCNC: 3.6 G/DL — SIGNIFICANT CHANGE UP (ref 3.3–5.2)
ALP SERPL-CCNC: 61 U/L — SIGNIFICANT CHANGE UP (ref 40–120)
ALT FLD-CCNC: 21 U/L — SIGNIFICANT CHANGE UP
ANION GAP SERPL CALC-SCNC: 11 MMOL/L — SIGNIFICANT CHANGE UP (ref 5–17)
APTT BLD: 28.1 SEC — SIGNIFICANT CHANGE UP (ref 27.5–35.5)
AST SERPL-CCNC: 35 U/L — HIGH
BILIRUB SERPL-MCNC: 0.4 MG/DL — SIGNIFICANT CHANGE UP (ref 0.4–2)
BUN SERPL-MCNC: 44 MG/DL — HIGH (ref 8–20)
CALCIUM SERPL-MCNC: 9.4 MG/DL — SIGNIFICANT CHANGE UP (ref 8.6–10.2)
CHLORIDE SERPL-SCNC: 104 MMOL/L — SIGNIFICANT CHANGE UP (ref 98–107)
CO2 SERPL-SCNC: 23 MMOL/L — SIGNIFICANT CHANGE UP (ref 22–29)
CREAT SERPL-MCNC: 1.33 MG/DL — HIGH (ref 0.5–1.3)
GLUCOSE SERPL-MCNC: 92 MG/DL — SIGNIFICANT CHANGE UP (ref 70–99)
HCT VFR BLD CALC: 26.5 % — LOW (ref 34.5–45)
HGB BLD-MCNC: 8.1 G/DL — LOW (ref 11.5–15.5)
INR BLD: 1.15 RATIO — SIGNIFICANT CHANGE UP (ref 0.88–1.16)
MAGNESIUM SERPL-MCNC: 2 MG/DL — SIGNIFICANT CHANGE UP (ref 1.6–2.6)
MCHC RBC-ENTMCNC: 28.1 PG — SIGNIFICANT CHANGE UP (ref 27–34)
MCHC RBC-ENTMCNC: 30.6 GM/DL — LOW (ref 32–36)
MCV RBC AUTO: 92 FL — SIGNIFICANT CHANGE UP (ref 80–100)
PLATELET # BLD AUTO: 259 K/UL — SIGNIFICANT CHANGE UP (ref 150–400)
POTASSIUM SERPL-MCNC: 4.7 MMOL/L — SIGNIFICANT CHANGE UP (ref 3.5–5.3)
POTASSIUM SERPL-SCNC: 4.7 MMOL/L — SIGNIFICANT CHANGE UP (ref 3.5–5.3)
PROT SERPL-MCNC: 6.3 G/DL — LOW (ref 6.6–8.7)
PROTHROM AB SERPL-ACNC: 13.3 SEC — SIGNIFICANT CHANGE UP (ref 10.6–13.6)
RBC # BLD: 2.88 M/UL — LOW (ref 3.8–5.2)
RBC # FLD: 15.5 % — HIGH (ref 10.3–14.5)
SODIUM SERPL-SCNC: 138 MMOL/L — SIGNIFICANT CHANGE UP (ref 135–145)
WBC # BLD: 11.62 K/UL — HIGH (ref 3.8–10.5)
WBC # FLD AUTO: 11.62 K/UL — HIGH (ref 3.8–10.5)

## 2021-03-10 PROCEDURE — 93926 LOWER EXTREMITY STUDY: CPT

## 2021-03-10 PROCEDURE — 99232 SBSQ HOSP IP/OBS MODERATE 35: CPT

## 2021-03-10 PROCEDURE — 71045 X-RAY EXAM CHEST 1 VIEW: CPT

## 2021-03-10 PROCEDURE — 86901 BLOOD TYPING SEROLOGIC RH(D): CPT

## 2021-03-10 PROCEDURE — 99285 EMERGENCY DEPT VISIT HI MDM: CPT | Mod: 25

## 2021-03-10 PROCEDURE — 85027 COMPLETE CBC AUTOMATED: CPT

## 2021-03-10 PROCEDURE — 85025 COMPLETE CBC W/AUTO DIFF WBC: CPT

## 2021-03-10 PROCEDURE — 85610 PROTHROMBIN TIME: CPT

## 2021-03-10 PROCEDURE — U0005: CPT

## 2021-03-10 PROCEDURE — U0003: CPT

## 2021-03-10 PROCEDURE — 83735 ASSAY OF MAGNESIUM: CPT

## 2021-03-10 PROCEDURE — 80053 COMPREHEN METABOLIC PANEL: CPT

## 2021-03-10 PROCEDURE — 36000 PLACE NEEDLE IN VEIN: CPT

## 2021-03-10 PROCEDURE — 86769 SARS-COV-2 COVID-19 ANTIBODY: CPT

## 2021-03-10 PROCEDURE — 86900 BLOOD TYPING SEROLOGIC ABO: CPT

## 2021-03-10 PROCEDURE — 85730 THROMBOPLASTIN TIME PARTIAL: CPT

## 2021-03-10 PROCEDURE — 36415 COLL VENOUS BLD VENIPUNCTURE: CPT

## 2021-03-10 PROCEDURE — 93005 ELECTROCARDIOGRAM TRACING: CPT

## 2021-03-10 PROCEDURE — 86850 RBC ANTIBODY SCREEN: CPT

## 2021-03-10 RX ORDER — SIMVASTATIN 20 MG/1
1 TABLET, FILM COATED ORAL
Qty: 0 | Refills: 0 | DISCHARGE
Start: 2021-03-10

## 2021-03-10 RX ORDER — APIXABAN 2.5 MG/1
1 TABLET, FILM COATED ORAL
Qty: 60 | Refills: 1
Start: 2021-03-10 | End: 2021-05-08

## 2021-03-10 RX ADMIN — Medication 1 PACKET(S): at 05:13

## 2021-03-10 RX ADMIN — Medication 81 MILLIGRAM(S): at 13:28

## 2021-03-10 RX ADMIN — SODIUM CHLORIDE 3 MILLILITER(S): 9 INJECTION INTRAMUSCULAR; INTRAVENOUS; SUBCUTANEOUS at 13:28

## 2021-03-10 RX ADMIN — Medication 20 MILLIGRAM(S): at 13:28

## 2021-03-10 RX ADMIN — Medication 20 MILLIGRAM(S): at 05:15

## 2021-03-10 RX ADMIN — APIXABAN 2.5 MILLIGRAM(S): 2.5 TABLET, FILM COATED ORAL at 05:13

## 2021-03-10 RX ADMIN — Medication 1 PACKET(S): at 13:28

## 2021-03-10 RX ADMIN — Medication 75 MICROGRAM(S): at 05:13

## 2021-03-10 RX ADMIN — PANTOPRAZOLE SODIUM 40 MILLIGRAM(S): 20 TABLET, DELAYED RELEASE ORAL at 05:13

## 2021-03-10 RX ADMIN — SODIUM CHLORIDE 3 MILLILITER(S): 9 INJECTION INTRAMUSCULAR; INTRAVENOUS; SUBCUTANEOUS at 05:15

## 2021-03-10 RX ADMIN — Medication 25 MILLIGRAM(S): at 05:13

## 2021-03-10 NOTE — DISCHARGE NOTE PROVIDER - NSDCMRMEDTOKEN_GEN_ALL_CORE_FT
apixaban 2.5 mg oral tablet: 1 tab(s) orally 2 times a day  aspirin 81 mg oral tablet, chewable: 1 tab(s) orally once a day  levothyroxine 75 mcg (0.075 mg) oral tablet: 1 tab(s) orally once a day  metoprolol succinate 25 mg oral tablet, extended release: 1 tab(s) orally once a day  pantoprazole 40 mg oral delayed release tablet: 1 tab(s) orally once a day  Probiotic Formula oral capsule: 1 cap(s) orally once a day  senna oral tablet: 2 tab(s) orally once a day (at bedtime), As Needed  sildenafil 20 mg oral tablet: 1 tab(s) orally 3 times a day  simvastatin 20 mg oral tablet: 1 tab(s) orally once a day (at bedtime)

## 2021-03-10 NOTE — PROGRESS NOTE ADULT - PROBLEM SELECTOR PLAN 4
Bun/cr at baseline. Monitor labs daily.  Monitor U/O and replace lytes PRN
Bun/cr at baseline. Monitor labs daily.  Monitor U/O and replace lytes PRN

## 2021-03-10 NOTE — PROGRESS NOTE ADULT - ASSESSMENT
89 year Female (Recently admitted @ [facility] 2/22/21-2/25/21 for Acute on Chronic Diastolic CHF), PMHx of Sev AS/CAD s/p AVR(T) C1L (12/16/10 @ [facility]) now with Severe Bioprosthetic AI / Failed Surgical Valve, HTN, HLD, Baseline 1st degree AVB, PE (? 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 Denny on 3/5/2021, discharged on 3/7/21. Seen in CT surgery office on 3/8 after noticing oozing from RT groin at home. Patient was sent for US of RT groin which revealed a hematoma as well as a RT femoral vein DVT. Patient to be admitted to CT surgery service for management of RT groin hematoma and DVT. 
89 year Female (Recently admitted @ [facility] 2/22/21-2/25/21 for Acute on Chronic Diastolic CHF), PMHx of Sev AS/CAD s/p AVR(T) C1L (12/16/10 @ [facility]) now with Severe Bioprosthetic AI / Failed Surgical Valve, HTN, HLD, Baseline 1st degree AVB, PE (? 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 Denny on 3/5/2021, discharged on 3/7/21. Seen in CT surgery office on 3/8 after noticing oozing from RT groin at home. Patient was sent for US of RT groin which revealed a hematoma as well as a RT femoral vein DVT. Patient to be admitted to CT surgery service for management of RT groin hematoma and DVT.

## 2021-03-10 NOTE — DISCHARGE NOTE PROVIDER - NSDCFUADDINST_GEN_ALL_CORE_FT
Please call the Cardiothoracic Surgery office at [phone] if you are experiencing any shortness of breath, chest pain, fevers or chills, drainage from the incisions, persistent nausea, vomiting or if you have any questions about your medications. If the symptoms are severe, call 911 and go to the nearest hospital.

## 2021-03-10 NOTE — PROGRESS NOTE ADULT - PROBLEM SELECTOR PLAN 2
RT groin with small hematoma. No need for any surgical intervention at this time. Monitor RT groin closely while admitted and after AC starts.  Cont Eliquis (renal dosing per Dr Denny) 2.5 BID
RT groin with small hematoma. No need for any surgical intervention at this time. Monitor RT groin closely while admitted and after AC starts.  Cont Eliquis (renal dosing per Dr Denny) 2.5 BID

## 2021-03-10 NOTE — PROGRESS NOTE ADULT - PROBLEM SELECTOR PROBLEM 3
Acute deep vein thrombosis (DVT) of femoral vein of right lower extremity
Acute deep vein thrombosis (DVT) of femoral vein of right lower extremity

## 2021-03-10 NOTE — DISCHARGE NOTE PROVIDER - NSDCFUADDAPPT_GEN_ALL_CORE_FT
Please follow up with Dr. Denny on  at [facility].     **Please obtain Chest X-Ray (script enclosed in folder) 1-2 days PRIOR to scheduled appointment, preferably at a [facility] facility.**    The cardiac surgery office is located at   Please call the CT Surgery office upon arrival to your appointment, you will then be instructed when to enter the building. This is being done due to COVID precautions.  Your Care Navigator Nurse Practitioner will be in touch to see you in your home within a few days from discharge. The Follow Your Heart program can help ensure you understand your medications, discharge instructions and answer any questions you may have at that time. They are also a great source to address concerns during the day and may be reached at [phone].    Your Care Navigator Nurse Practitioner will be in touch to see you in your home within a few days from discharge. The Follow Your Heart program can help ensure you understand your medications, discharge instructions and answer any questions you may have at that time. They are also a great source to address concerns during the day and may be reached at [phone].

## 2021-03-10 NOTE — PROGRESS NOTE ADULT - PROBLEM SELECTOR PLAN 1
S/p valve in valve TAVR. on 3/5. TTE demonstrating well-seated functional valve.  Continuous HD and pulse ox monitoring  Monitor I+Os and U/O, replace lytes PRN  Continue ASA/Plavix  Transfer to 4T[facility] pending negative COVID Swab
S/p valve in valve TAVR. on 3/5. TTE demonstrating well-seated functional valve.  Continuous HD and pulse ox monitoring  Monitor I+Os and U/O, replace lytes PRN  Continue ASA/Plavix

## 2021-03-10 NOTE — DISCHARGE NOTE PROVIDER - CARE PROVIDER_API CALL
Refilled 90 day supply vs 30 day supply     Stalin Denny  [address] [place] [address], [address], [place], [place].  Phone: [phone]  Fax: (   )    -  Scheduled Appointment: 03/15/2021 02:00 PM

## 2021-03-10 NOTE — DISCHARGE NOTE NURSING/CASE MANAGEMENT/SOCIAL WORK - PATIENT PORTAL LINK FT
You can access the FollowMyHealth Patient Portal offered by [facility] by registering at the following website: [email or web address]. By joining [email or web address]’s FollowMyHealth portal, you will also be able to view your health information using other applications (apps) compatible with our system.

## 2021-03-10 NOTE — DISCHARGE NOTE PROVIDER - HOSPITAL COURSE
89 year Female (Recently admitted @ [facility] 2/22/21-2/25/21 for Acute on Chronic Diastolic CHF), PMHx of Sev AS/CAD s/p AVR(T) C1L (12/16/10 @ 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 Denny on 3/5/2021, discharged on 3/7/21. Seen in CT surgery office on 3/8 after noticing oozing from RT groin at home. Patient was sent for US of RT groin which revealed a hematoma as well as a RT femoral vein DVT. Patient to be admitted to CT surgery service for management of RT groin hematoma and DVT. Pt placed on Eliquis 2.5 for DVT prophlaxis and hematoma stable. right annel site checked with Dr. Shah at the bedside patient determined stable for discharge today.

## 2021-03-10 NOTE — DISCHARGE NOTE PROVIDER - NSDCCPCAREPLAN_GEN_ALL_CORE_FT
PRINCIPAL DISCHARGE DIAGNOSIS  Diagnosis: Acute deep vein thrombosis (DVT) of femoral vein of right lower extremity  Assessment and Plan of Treatment: You where found to have a Deep venous thrombosis within the right common femoral vein and greater saphenous vein at the saphenofemoral junction. We started you on blood thinner called Eliquis. it is important to take your medications as ordered.   It was also deteremined that you didnt have a pseudoaneurysm in the right groin.        SECONDARY DISCHARGE DIAGNOSES  Diagnosis: Hematoma of groin  Assessment and Plan of Treatment: Hematoma of groin  You where found to have a 2x3 cm hematoma that is stable. It will dissovle on its own. Please continue to monitor and call the Office [phone] if ther is any further drainage or if it appears to be growing in size.

## 2021-03-10 NOTE — CHART NOTE - NSCHARTNOTEFT_GEN_A_CORE
89 year Female (Recently admitted @ [facility] 2/22/21-2/25/21 for Acute on Chronic Diastolic CHF), PMHx of Sev AS/CAD s/p AVR(T) C1L (12/16/10 @ [facility]) now with Severe Bioprosthetic AI / Failed Surgical Valve, HTN, HLD, Baseline 1st degree AVB, PE (? 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 Denny on 3/5/2021, discharged on 3/7/21. Seen in CT surgery office on 3/8 after noticing oozing from RT groin at home. Patient was sent for US of RT groin which revealed a hematoma as well as a RT femoral vein DVT. Patient to be admitted to CT surgery service for management of RT groin hematoma and DVT.     Plan:  Ensure added to diet per pt request.  Metamucil added per pt request.  Plavix stopped now that on Eliquis (per Dr. Denny).  Continue ASA 81mg.  Discussed with Dr. Denny.
Brief Summary     89 y/o F s/p  transfemoral valve in valve TAVR (corevalve) via [facility] cutdown with Dr. Denny on 3/5/2021, discharged on 3/7/21. Seen in CT surgery office on 3/8 after noticing oozing from RT groin at home. Patient was sent for US of RT groin which revealed a hematoma as well as a RT femoral vein DVT.   Hematoma stable, pt on Eliquis for DVT.     VSS. Pt in bed alert and oriented X3. Patient denies acute pain with radiating or aggravating factors.  SHe denies chest pain, shortness of breath, palpitations, headache, dizziness, nausea, or vomiting.    General: Well appearing, NAD  Neuro: AxO x3, non-focal, MARY  Cardiac: S1S2, no murmurs  Pulm: CTA b/l, no wheezing or rales  Abdomen: Soft, NT, ND, normoactive BS  Peripheral: +DP pulses b/l, no peripheral edema, right groin + hematoma (stable), C/D/I no drainage noted .      Pt was seen at the bedside with the attending Surgeon on call and determined stable for discharge with follow up.    Severe aortic insufficiency.    S/p valve in valve TAVR. on 3/5.   TTE demonstrating well-seated functional valve.  Continue ASA, Eliquis      Hematoma of groin.    RT groin with small hematoma.   o need for any surgical intervention at this time. Monitor RT groin closely while admitted and after AC starts.  Cont Eliquis (renal dosing per Dr Denny) 2.5 BID.       Acute deep vein thrombosis (DVT) of femoral vein of right lower extremity.    New DVT of RT femoral and GSV.   Anticoagulate with renally dosed Eliquis.     Chronic kidney disease, stage 3 to stage 5.  Plan: Bun/cr at baseline. Monitor labs daily.  Monitor U/O and replace lytes PRN.     HLD (hyperlipidemia).    Plan: Continue statin.     CAD (coronary artery disease).   Plan: Continue ASA  Continue Beta blocker.      Prophylactic measure.  Cont  eliquis DVT and GI prophylaxis    Dispo HOME today 3/10 as per Dr. Shah . Pt seen at the bedside with JCHARIS ( with assessment of right groin) and determined to be stable for discharge    PLAN of care D/W Dr. Denny

## 2021-03-10 NOTE — DISCHARGE NOTE PROVIDER - NSDCFUSCHEDAPPT_GEN_ALL_CORE_FT
ZARA LOPEZ ; 03/15/2021 ; [facility]

## 2021-03-10 NOTE — DISCHARGE NOTE PROVIDER - PROVIDER TOKENS
FREE:[LAST:[Denisha],FIRST:[Stalin],PHONE:[phone]],FAX:[(   )    -],ADDRESS:[[address] [place] [address], [address]],SCHEDULEDAPPT:[03/15/2021],SCHEDULEDAPPTTIME:[02:00 PM]]

## 2021-03-10 NOTE — PROGRESS NOTE ADULT - PROBLEM SELECTOR PLAN 3
New DVT of RT femoral and GSV. Plan to anticoagulate with renally dosed eliquis upon arrival to 4T[facility]. New DVT of RT femoral and GSV.   Anticoagulate with renally dosed Eliquis

## 2021-03-12 ENCOUNTER — NON-APPOINTMENT (OUTPATIENT)
Age: 86
End: 2021-03-12

## 2021-03-12 PROBLEM — I35.1 NONRHEUMATIC AORTIC (VALVE) INSUFFICIENCY: Chronic | Status: ACTIVE | Noted: 2021-03-08

## 2021-03-12 PROBLEM — T82.857A STENOSIS OF PROSTHETIC AORTIC VALVE: Status: ACTIVE | Noted: 2021-03-12

## 2021-03-12 RX ORDER — AMLODIPINE BESYLATE 10 MG/1
10 TABLET ORAL
Refills: 0 | Status: ACTIVE | COMMUNITY

## 2021-03-15 ENCOUNTER — NON-APPOINTMENT (OUTPATIENT)
Age: 86
End: 2021-03-15

## 2021-03-15 ENCOUNTER — TRANSCRIPTION ENCOUNTER (OUTPATIENT)
Age: 86
End: 2021-03-15

## 2021-03-16 ENCOUNTER — APPOINTMENT (OUTPATIENT)
Dept: CARE COORDINATION | Facility: HOME HEALTH | Age: 86
End: 2021-03-16

## 2021-03-16 PROCEDURE — 85610 PROTHROMBIN TIME: CPT

## 2021-03-16 PROCEDURE — 97163 PT EVAL HIGH COMPLEX 45 MIN: CPT

## 2021-03-16 PROCEDURE — C1889: CPT

## 2021-03-16 PROCEDURE — 80053 COMPREHEN METABOLIC PANEL: CPT

## 2021-03-16 PROCEDURE — 93306 TTE W/DOPPLER COMPLETE: CPT

## 2021-03-16 PROCEDURE — 85018 HEMOGLOBIN: CPT

## 2021-03-16 PROCEDURE — 93325 DOPPLER ECHO COLOR FLOW MAPG: CPT

## 2021-03-16 PROCEDURE — 86900 BLOOD TYPING SEROLOGIC ABO: CPT

## 2021-03-16 PROCEDURE — P9045: CPT

## 2021-03-16 PROCEDURE — 82803 BLOOD GASES ANY COMBINATION: CPT

## 2021-03-16 PROCEDURE — 36415 COLL VENOUS BLD VENIPUNCTURE: CPT

## 2021-03-16 PROCEDURE — 84134 ASSAY OF PREALBUMIN: CPT

## 2021-03-16 PROCEDURE — 99285 EMERGENCY DEPT VISIT HI MDM: CPT | Mod: 25

## 2021-03-16 PROCEDURE — 84295 ASSAY OF SERUM SODIUM: CPT

## 2021-03-16 PROCEDURE — 86850 RBC ANTIBODY SCREEN: CPT

## 2021-03-16 PROCEDURE — C1894: CPT

## 2021-03-16 PROCEDURE — 86769 SARS-COV-2 COVID-19 ANTIBODY: CPT

## 2021-03-16 PROCEDURE — 93308 TTE F-UP OR LMTD: CPT

## 2021-03-16 PROCEDURE — C1769: CPT

## 2021-03-16 PROCEDURE — 74174 CTA ABD&PLVS W/CONTRAST: CPT

## 2021-03-16 PROCEDURE — 82435 ASSAY OF BLOOD CHLORIDE: CPT

## 2021-03-16 PROCEDURE — 83605 ASSAY OF LACTIC ACID: CPT

## 2021-03-16 PROCEDURE — 86923 COMPATIBILITY TEST ELECTRIC: CPT

## 2021-03-16 PROCEDURE — 97116 GAIT TRAINING THERAPY: CPT

## 2021-03-16 PROCEDURE — 83735 ASSAY OF MAGNESIUM: CPT

## 2021-03-16 PROCEDURE — 84443 ASSAY THYROID STIM HORMONE: CPT

## 2021-03-16 PROCEDURE — 71045 X-RAY EXAM CHEST 1 VIEW: CPT

## 2021-03-16 PROCEDURE — 80048 BASIC METABOLIC PNL TOTAL CA: CPT

## 2021-03-16 PROCEDURE — 83880 ASSAY OF NATRIURETIC PEPTIDE: CPT

## 2021-03-16 PROCEDURE — U0005: CPT

## 2021-03-16 PROCEDURE — 93005 ELECTROCARDIOGRAM TRACING: CPT

## 2021-03-16 PROCEDURE — 81001 URINALYSIS AUTO W/SCOPE: CPT

## 2021-03-16 PROCEDURE — 36000 PLACE NEEDLE IN VEIN: CPT

## 2021-03-16 PROCEDURE — U0003: CPT

## 2021-03-16 PROCEDURE — 87640 STAPH A DNA AMP PROBE: CPT

## 2021-03-16 PROCEDURE — C1887: CPT

## 2021-03-16 PROCEDURE — 85027 COMPLETE CBC AUTOMATED: CPT

## 2021-03-16 PROCEDURE — 82947 ASSAY GLUCOSE BLOOD QUANT: CPT

## 2021-03-16 PROCEDURE — 76000 FLUOROSCOPY <1 HR PHYS/QHP: CPT

## 2021-03-16 PROCEDURE — 85730 THROMBOPLASTIN TIME PARTIAL: CPT

## 2021-03-16 PROCEDURE — 87641 MR-STAPH DNA AMP PROBE: CPT

## 2021-03-16 PROCEDURE — 71275 CT ANGIOGRAPHY CHEST: CPT

## 2021-03-16 PROCEDURE — 85025 COMPLETE CBC W/AUTO DIFF WBC: CPT

## 2021-03-16 PROCEDURE — 93320 DOPPLER ECHO COMPLETE: CPT

## 2021-03-16 PROCEDURE — 93312 ECHO TRANSESOPHAGEAL: CPT

## 2021-03-16 PROCEDURE — 86901 BLOOD TYPING SEROLOGIC RH(D): CPT

## 2021-03-16 PROCEDURE — 84132 ASSAY OF SERUM POTASSIUM: CPT

## 2021-03-16 PROCEDURE — 82330 ASSAY OF CALCIUM: CPT

## 2021-03-16 PROCEDURE — 85014 HEMATOCRIT: CPT

## 2021-03-16 PROCEDURE — 97530 THERAPEUTIC ACTIVITIES: CPT

## 2021-03-16 PROCEDURE — 83935 ASSAY OF URINE OSMOLALITY: CPT

## 2021-03-16 PROCEDURE — 84484 ASSAY OF TROPONIN QUANT: CPT

## 2021-03-17 VITALS
HEART RATE: 86 BPM | SYSTOLIC BLOOD PRESSURE: 112 MMHG | DIASTOLIC BLOOD PRESSURE: 80 MMHG | RESPIRATION RATE: 16 BRPM | WEIGHT: 133 LBS | BODY MASS INDEX: 24.48 KG/M2 | OXYGEN SATURATION: 94 % | HEIGHT: 62 IN

## 2021-03-17 RX ORDER — ACETAMINOPHEN, ASPIRIN, CAFFEINE 250; 250; 65 MG/1; MG/1; MG/1
250-250-65 TABLET, FILM COATED ORAL
Refills: 0 | Status: DISCONTINUED | COMMUNITY
End: 2021-03-17

## 2021-03-17 RX ORDER — APIXABAN 5 MG/1
5 TABLET, FILM COATED ORAL
Qty: 180 | Refills: 1 | Status: ACTIVE | COMMUNITY

## 2021-03-17 NOTE — REVIEW OF SYSTEMS
[Fever] : no fever [Chills] : no chills [Feeling Tired] : feeling tired [Abdominal Pain] : no abdominal pain [Vomiting] : no vomiting [Constipation] : no constipation [Diarrhea] : no diarrhea [Negative] : Psychiatric [FreeTextEntry7] : last BM this evening several small loose

## 2021-03-17 NOTE — PHYSICAL EXAM
[Sclera] : the sclera and conjunctiva were normal [Neck Appearance] : the appearance of the neck was normal [Respiration, Rhythm And Depth] : normal respiratory rhythm and effort [Auscultation Breath Sounds / Voice Sounds] : lungs were clear to auscultation bilaterally [Apical Impulse] : the apical impulse was normal [Heart Rate And Rhythm] : heart rate was normal and rhythm regular [Heart Sounds] : normal S1 and S2 [Murmurs] : no murmurs [1+] : left 1+ [FreeTextEntry2] : B/L LE without edema, B/L calves soft, NT [Bowel Sounds] : normal bowel sounds [Abdomen Soft] : soft [Abdomen Tenderness] : non-tender [Abnormal Walk] : normal gait [Musculoskeletal - Swelling] : no joint swelling seen [Skin Color & Pigmentation] : normal skin color and pigmentation [Skin Turgor] : normal skin turgor [] : no rash [FreeTextEntry1] : Right groin site with soft resolving ecchymosis, no further ecchymosis noted outside of previously marked. Slight induration noted under incision, non tender, unchanged as per pt. Left groin site unremarkable.  [Oriented To Time, Place, And Person] : oriented to person, place, and time [Impaired Insight] : insight and judgment were intact [Affect] : the affect was normal

## 2021-03-17 NOTE — ASSESSMENT
[FreeTextEntry1] : Pt recovering well at home s/p TAVR. Reviewed all medications and dosages with pt understanding. Pt has all medications in home and is taking as prescribed. Pt advised to stop further Senna as she was up several times overnight with soft, looser stools. Pt groin site appears to be unchanged without worsening ecchymosis.   Pain controlled with Tylenol PRN. Pt on RA currently but uses 02 at night and with exertion. No further new symptoms, issues or concerns to report at this time.\par

## 2021-03-17 NOTE — HISTORY OF PRESENT ILLNESS
[FreeTextEntry1] : 89 year Female (Recently admitted @ [facility] 2/22/21-2/25/21 for Acute on Chronic \par Diastolic CHF), PMHx of Sev AS/CAD s/p AVR(T) C1L (12/16/10 @ [facility]) now with \par Severe Bioprosthetic AI / Failed Surgical Valve, HTN, HLD, Baseline 1st degree \par AVB, PE (? timing - no AC), Former Smoker, COPD with Mod-Sev Pulm HTN (home \par O2), Pleural Effusion s/p L-thoracentesis (2/23/21), Stage IV CKD, Multiple \par Kidney Lesions (f/u), Chronic Anemia, Tonsillectomy, Cholelithiasis, R-inguinal \par Hernia Repair, Hypothyroid, OA, Lumbar Spinal Stenosis  (A1C never sent), \par underwent a transfemoral valve in valve TAVR (corevalve) via [facility] cutdown with \par Dr. Denny on 3/5/2021, discharged on 3/7/21. Seen in CT surgery office on \par 3/8 after noticing oozing from RT groin at home. Patient was sent for US of RT \par groin which revealed a hematoma as well as a RT femoral vein DVT. Patient to be \par admitted to CT surgery service for management of RT groin hematoma and DVT. Pt \par placed on Eliquis 2.5 for DVT prophylaxis and hematoma stable right annel site prior to discharge. Pt discharged home with home care services with [facility]. Initial [facility] visit completed in home. \par CC "I'm feeling tired but ok" \par

## 2021-03-17 NOTE — REASON FOR VISIT
[Follow-Up: _____] : a [unfilled] follow-up visit [FreeTextEntry1] : FOLLOW YOUR HEART- Transitional Care Management Program- [facility]\par \par

## 2021-03-18 ENCOUNTER — APPOINTMENT (OUTPATIENT)
Dept: RADIOLOGY | Facility: CLINIC | Age: 86
End: 2021-03-18
Payer: MEDICARE

## 2021-03-18 ENCOUNTER — RESULT REVIEW (OUTPATIENT)
Age: 86
End: 2021-03-18

## 2021-03-18 ENCOUNTER — APPOINTMENT (OUTPATIENT)
Dept: CARDIOTHORACIC SURGERY | Facility: CLINIC | Age: 86
End: 2021-03-18
Payer: MEDICARE

## 2021-03-18 ENCOUNTER — OUTPATIENT (OUTPATIENT)
Dept: OUTPATIENT SERVICES | Facility: HOSPITAL | Age: 86
LOS: 1 days | End: 2021-03-18
Payer: MEDICARE

## 2021-03-18 DIAGNOSIS — Z95.2 PRESENCE OF PROSTHETIC HEART VALVE: ICD-10-CM

## 2021-03-18 DIAGNOSIS — Z98.890 OTHER SPECIFIED POSTPROCEDURAL STATES: Chronic | ICD-10-CM

## 2021-03-18 DIAGNOSIS — Z95.2 PRESENCE OF PROSTHETIC HEART VALVE: Chronic | ICD-10-CM

## 2021-03-18 DIAGNOSIS — I35.9 NONRHEUMATIC AORTIC VALVE DISORDER, UNSPECIFIED: ICD-10-CM

## 2021-03-18 DIAGNOSIS — I35.0 NONRHEUMATIC AORTIC (VALVE) STENOSIS: ICD-10-CM

## 2021-03-18 DIAGNOSIS — J90 PLEURAL EFFUSION, NOT ELSEWHERE CLASSIFIED: ICD-10-CM

## 2021-03-18 PROCEDURE — 71046 X-RAY EXAM CHEST 2 VIEWS: CPT | Mod: 26

## 2021-03-18 PROCEDURE — 71046 X-RAY EXAM CHEST 2 VIEWS: CPT

## 2021-03-18 PROCEDURE — 99213 OFFICE O/P EST LOW 20 MIN: CPT

## 2021-03-19 PROBLEM — Z95.2 S/P TAVR (TRANSCATHETER AORTIC VALVE REPLACEMENT): Status: ACTIVE | Noted: 2021-03-08

## 2021-03-19 PROBLEM — I35.9 AORTIC VALVE DISEASE: Status: ACTIVE | Noted: 2021-01-25

## 2021-03-19 PROBLEM — I35.0 SEVERE AORTIC STENOSIS: Status: ACTIVE | Noted: 2021-03-12

## 2021-04-06 ENCOUNTER — TRANSCRIPTION ENCOUNTER (OUTPATIENT)
Age: 86
End: 2021-04-06

## 2021-05-20 DIAGNOSIS — I25.10 ATHEROSCLEROTIC HEART DISEASE OF NATIVE CORONARY ARTERY WITHOUT ANGINA PECTORIS: ICD-10-CM

## 2021-05-20 PROCEDURE — U0005: CPT

## 2021-05-20 PROCEDURE — 85610 PROTHROMBIN TIME: CPT

## 2021-05-20 PROCEDURE — 93325 DOPPLER ECHO COLOR FLOW MAPG: CPT

## 2021-05-20 PROCEDURE — 80048 BASIC METABOLIC PNL TOTAL CA: CPT

## 2021-05-20 PROCEDURE — 36415 COLL VENOUS BLD VENIPUNCTURE: CPT

## 2021-05-20 PROCEDURE — U0003: CPT

## 2021-05-20 PROCEDURE — 93320 DOPPLER ECHO COMPLETE: CPT

## 2021-05-20 PROCEDURE — 93312 ECHO TRANSESOPHAGEAL: CPT

## 2021-05-20 PROCEDURE — 85730 THROMBOPLASTIN TIME PARTIAL: CPT

## 2021-05-20 PROCEDURE — 93005 ELECTROCARDIOGRAM TRACING: CPT

## 2021-05-20 PROCEDURE — 85027 COMPLETE CBC AUTOMATED: CPT

## 2021-08-02 ENCOUNTER — APPOINTMENT (OUTPATIENT)
Dept: OPHTHALMOLOGY | Facility: CLINIC | Age: 86
End: 2021-08-02
Payer: MEDICARE

## 2021-08-02 ENCOUNTER — NON-APPOINTMENT (OUTPATIENT)
Age: 86
End: 2021-08-02

## 2021-08-02 PROCEDURE — 92014 COMPRE OPH EXAM EST PT 1/>: CPT

## 2022-01-19 ENCOUNTER — APPOINTMENT (OUTPATIENT)
Dept: OPHTHALMOLOGY | Facility: CLINIC | Age: 87
End: 2022-01-19

## 2022-03-14 ENCOUNTER — APPOINTMENT (OUTPATIENT)
Dept: OPHTHALMOLOGY | Facility: CLINIC | Age: 87
End: 2022-03-14
Payer: MEDICARE

## 2022-03-14 ENCOUNTER — NON-APPOINTMENT (OUTPATIENT)
Age: 87
End: 2022-03-14

## 2022-03-14 PROCEDURE — 92014 COMPRE OPH EXAM EST PT 1/>: CPT

## 2022-09-12 ENCOUNTER — APPOINTMENT (OUTPATIENT)
Dept: OPHTHALMOLOGY | Facility: CLINIC | Age: 87
End: 2022-09-12

## 2022-09-19 ENCOUNTER — NON-APPOINTMENT (OUTPATIENT)
Age: 87
End: 2022-09-19

## 2022-09-19 ENCOUNTER — APPOINTMENT (OUTPATIENT)
Dept: OPHTHALMOLOGY | Facility: CLINIC | Age: 87
End: 2022-09-19

## 2022-09-19 PROCEDURE — 92014 COMPRE OPH EXAM EST PT 1/>: CPT

## 2022-09-19 PROCEDURE — 92133 CPTRZD OPH DX IMG PST SGM ON: CPT

## 2023-01-01 NOTE — PROVIDER CONTACT NOTE (CRITICAL VALUE NOTIFICATION) - NS PROVIDER READ BACK TO LAB
"Pediatric Surgery Note  Neonatologist on rounds.    Tawnya Ellis MD  Pediatric General & Thoracic Surgery  Pager: [phone]         yes

## 2023-03-06 ENCOUNTER — APPOINTMENT (OUTPATIENT)
Dept: OPHTHALMOLOGY | Facility: CLINIC | Age: 88
End: 2023-03-06
Payer: MEDICARE

## 2023-03-06 ENCOUNTER — NON-APPOINTMENT (OUTPATIENT)
Age: 88
End: 2023-03-06

## 2023-03-06 PROCEDURE — 92083 EXTENDED VISUAL FIELD XM: CPT

## 2023-03-06 PROCEDURE — 92133 CPTRZD OPH DX IMG PST SGM ON: CPT

## 2023-03-13 ENCOUNTER — APPOINTMENT (OUTPATIENT)
Dept: OPHTHALMOLOGY | Facility: CLINIC | Age: 88
End: 2023-03-13
Payer: MEDICARE

## 2023-03-13 ENCOUNTER — NON-APPOINTMENT (OUTPATIENT)
Age: 88
End: 2023-03-13

## 2023-03-13 PROCEDURE — 92014 COMPRE OPH EXAM EST PT 1/>: CPT

## 2023-03-13 PROCEDURE — 92250 FUNDUS PHOTOGRAPHY W/I&R: CPT

## 2023-07-02 NOTE — ED ADULT NURSE REASSESSMENT NOTE - NS ED NURSE REASSESS COMMENT FT1
Assumed pt. care at 1930 from off going RN. Pt. A&Ox3. Pt. on cardiac monitor . Pt. in NSR in lead II. Pt. respirations even and unlabored. Pt. in no apparent distress. Pt. denies any pain at this time. Pt. informed of plan of care that she will be admitted. No new Blood . noted from incision site. Assumed pt. care at 1930 from off going RN. Pt. A&Ox3. Pt. on cardiac monitor . Pt. in NSR in lead II. Pt. respirations even and unlabored. Pt. on 2LNC, as she is at home. Pt. in no apparent distress. Pt. denies any pain at this time. Pt. informed of plan of care that she will be admitted. No new Blood . noted from incision site. red

## 2023-09-11 ENCOUNTER — NON-APPOINTMENT (OUTPATIENT)
Age: 88
End: 2023-09-11

## 2023-09-11 ENCOUNTER — APPOINTMENT (OUTPATIENT)
Dept: OPHTHALMOLOGY | Facility: CLINIC | Age: 88
End: 2023-09-11
Payer: MEDICARE

## 2023-09-11 PROCEDURE — 99214 OFFICE O/P EST MOD 30 MIN: CPT

## 2023-09-11 PROCEDURE — 92134 CPTRZ OPH DX IMG PST SGM RTA: CPT

## 2024-01-15 NOTE — ED ADULT NURSE NOTE - HOW OFTEN DO YOU HAVE A DRINK CONTAINING ALCOHOL?
directly overlying the most proximal static locking hole within the nail.  The incision was carried down to the level of the lateral cortex.  A drill bit was then used to drill within the center of the lateral femur, directly overlying this nail hole.  Once I had perforated the near cortex with the drill, the drill was passed with light taps using a mallet through the center of the nail into the level of the far cortex.  The drill darren was reattached and the drill bit was used to perforate the far cortex.  I removed the drill bit and then inserted a depth gauge to determine the appropriate length for the distal locking screw.  The locking screw was then inserted through the nail hole and appropriate cortical purchase was noted.  Final imaging was then obtained.     All wounds were copiously irrigated using normal saline.  0 Vicryl suture was used to repair the iliotibial fascia defects in the proximalmost incisions.  Subcutaneous tissues were closed using 2-0 Vicryl suture.  Approximately 30 cc of local anesthetic was injected into the subcutaneous tissues.  Skin margins were approximated using staples.  A sterile dressing was placed, anesthesia was reversed and patient was transferred back to their hospital bed.    Electronically signed by Gonzalo Fuchs DO on 1/15/2024 at 9:55 AM    
Never

## 2024-02-26 NOTE — ED PROVIDER NOTE - RESPIRATORY [+], MLM
PAST MEDICAL HISTORY:  Aortic stenosis     Atrial fibrillation     CVA (cerebral vascular accident)     Dementia     Hyperlipidemia     Hypertension     
EXERTIONAL DYSPNEA/ORTHOPNEA/SHORTNESS OF BREATH

## 2024-03-04 ENCOUNTER — APPOINTMENT (OUTPATIENT)
Dept: OPHTHALMOLOGY | Facility: CLINIC | Age: 89
End: 2024-03-04
Payer: MEDICARE

## 2024-03-04 ENCOUNTER — NON-APPOINTMENT (OUTPATIENT)
Age: 89
End: 2024-03-04

## 2024-03-04 PROCEDURE — 92133 CPTRZD OPH DX IMG PST SGM ON: CPT

## 2024-03-04 PROCEDURE — 92083 EXTENDED VISUAL FIELD XM: CPT

## 2024-03-11 ENCOUNTER — APPOINTMENT (OUTPATIENT)
Dept: OPHTHALMOLOGY | Facility: CLINIC | Age: 89
End: 2024-03-11
Payer: MEDICARE

## 2024-03-11 ENCOUNTER — NON-APPOINTMENT (OUTPATIENT)
Age: 89
End: 2024-03-11

## 2024-03-11 PROCEDURE — 92014 COMPRE OPH EXAM EST PT 1/>: CPT

## 2024-03-11 PROCEDURE — 92134 CPTRZ OPH DX IMG PST SGM RTA: CPT

## 2024-05-03 ENCOUNTER — APPOINTMENT (OUTPATIENT)
Dept: ORTHOPEDIC SURGERY | Facility: CLINIC | Age: 89
End: 2024-05-03
Payer: MEDICARE

## 2024-05-03 VITALS — BODY MASS INDEX: 23.92 KG/M2 | WEIGHT: 130 LBS | HEIGHT: 62 IN

## 2024-05-03 DIAGNOSIS — Z78.9 OTHER SPECIFIED HEALTH STATUS: ICD-10-CM

## 2024-05-03 DIAGNOSIS — Z87.891 PERSONAL HISTORY OF NICOTINE DEPENDENCE: ICD-10-CM

## 2024-05-03 PROCEDURE — 73562 X-RAY EXAM OF KNEE 3: CPT | Mod: 50

## 2024-05-03 PROCEDURE — 99204 OFFICE O/P NEW MOD 45 MIN: CPT | Mod: 25

## 2024-05-03 PROCEDURE — 20610 DRAIN/INJ JOINT/BURSA W/O US: CPT | Mod: 50

## 2024-05-05 NOTE — HISTORY OF PRESENT ILLNESS
[de-identified] : Date of initial evaluation is 05/03/2024 Patient age is 89 year Occupation is retired  Body part causing symptoms is the bilateral knees (LT>RT) Symptoms began  years ago, worse for the past 1 year, no injury Location of pain is medial Quality of pain is dull Pain score at rest is 7 Pain score during activity 10 Radicular symptoms are not present Prior treatments include tylenol Patient's condition is not associated with workers compensation, no-fault or interscholastic athletics

## 2024-05-05 NOTE — IMAGING
[de-identified] : (Exam: Knee)   Laterality is bilateral   Patient is in no acute distress, alert and oriented Sensation is grossly intact to light touch in the foot Motor function is 5/5 in the foot Capillary refill is less than 2 seconds in the toes Lymphadenopathy is not present Peripheral edema is not present   Skin is intact Effusion is trace Atrophy is not present Antalgic gait is present   Medial joint line tenderness is present Lateral joint line tenderness is present Patellar tenderness is present Calf tenderness is not present   Knee extension is 5 Knee flexion is 110   Quadriceps strength is 5-/5 Hamstring strength is 5/5   Lachman is normal Posterior drawer is normal Varus stress stability is normal Valgus stress stability is normal   Medial Alex test is abnormal Lateral Alex test is normal Patellofemoral grind test is abnormal Patellar apprehension test is normal [Bilateral] : knee bilaterally [All Views] : anteroposterior, lateral, skyline, and anteroposterior standing [advanced tricompartmental OA with medial compartment narrowing and varus alignment] : advanced tricompartmental OA with medial compartment narrowing and varus alignment

## 2024-05-05 NOTE — DISCUSSION/SUMMARY
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 The potential non-surgical and surgical treatments were reviewed. The relative risks and benefits of each option were considered relative to the patients age, activity level, medical history, symptom severity and previously attempted treatments.   The patient was advised to consult with their primary medical provider prior to initiation of any new medications to reduce the risk of adverse effects specific to their long-term home medications and medical history. The risk of gastrointestinal irritation and kidney injury specific to long-term NSAID use was discussed.  -Patient defers PT at this time. -Cortisone injection performed today for symptom relief. -Over the counter acetaminophen as needed for pain. -Follow up in 6 weeks. If symptoms persist consider HA.    ([facility])   Problem Complexity -Moderate: chronic illness with exacerbation   Risk -Moderate: injection   -Patient has not been seen by another provider in my practice within the past 2 years who specializes in orthopedic surgery.   (Procedure: Corticosteroid Injection)   Injection location was the: -left knee joint -right knee joint   Injection contents were: 40 mg of kenalog and 5 mL of 1% lidocaine   Procedure Details: -Informed consent was obtained. Discussed possible risks of corticosteroid injection including hyperglycemia, infection and skin discoloration. -Discussed that due to infection risk, an interval between injection and any future surgery is 6 weeks for arthroscopy and 3 months for arthroplasty. -Injection performed using aseptic technique. Hemostasis was confirmed. -Procedure was tolerated well with no complications.

## 2024-05-17 ENCOUNTER — APPOINTMENT (OUTPATIENT)
Dept: ORTHOPEDIC SURGERY | Facility: CLINIC | Age: 89
End: 2024-05-17
Payer: MEDICARE

## 2024-05-17 VITALS — HEIGHT: 62 IN | WEIGHT: 129 LBS | BODY MASS INDEX: 23.74 KG/M2

## 2024-05-17 DIAGNOSIS — Z78.9 OTHER SPECIFIED HEALTH STATUS: ICD-10-CM

## 2024-05-17 PROCEDURE — 99214 OFFICE O/P EST MOD 30 MIN: CPT | Mod: 25

## 2024-05-17 PROCEDURE — 20610 DRAIN/INJ JOINT/BURSA W/O US: CPT | Mod: 50

## 2024-05-17 NOTE — HISTORY OF PRESENT ILLNESS
[de-identified] : 5/17/24: Follow up bilateral knees. Patient had CSI injection last visit with no relief.   Date of initial evaluation is 05/03/2024 Patient age is 89 year Occupation is retired  Body part causing symptoms is the bilateral knees (LT>RT) Symptoms began  years ago, worse for the past 1 year, no injury Location of pain is medial Quality of pain is dull Pain score at rest is 7 Pain score during activity 10 Radicular symptoms are not present Prior treatments include tylenol Patient's condition is not associated with workers compensation, no-fault or interscholastic athletics

## 2024-05-17 NOTE — DISCUSSION/SUMMARY
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 The potential non-surgical and surgical treatments were reviewed. The relative risks and benefits of each option were considered relative to the patients age, activity level, medical history, symptom severity and previously attempted treatments.   The patient was advised to consult with their primary medical provider prior to initiation of any new medications to reduce the risk of adverse effects specific to their long-term home medications and medical history. The risk of gastrointestinal irritation and kidney injury specific to long-term NSAID use was discussed.  -Hyaluronic acid injection will be performed for symptom relief. The patient has severe degenerative joint disease that has not improved with multiple non-surgical modalities including cortisone injection, rest and oral pain medication. -Discussed that due to recent CSI that the HA injection may not be as effective. She still wishes to proceed.  -Patient defers PT at this time. -Over the counter acetaminophen as needed for pain. -Follow up in 1 week for next injection.    ([facility])   Problem Complexity -Moderate: chronic illness with exacerbation   Risk -Moderate: injection   (Procedure: Hyaluronic Acid Injection)   Injection location was the: -left knee joint -right knee joint   Injection contents were: -Gelsyn-3   Procedure Details: -Informed consent was obtained. Discussed possible risks of hyaluronic acid injection including infection and local inflammatory reaction. -Discussed that due to infection risk, an interval between injection and any future surgery is 6 weeks for arthroscopy and 3 months for arthroplasty. -Injection performed using aseptic technique. Hemostasis was confirmed. -Procedure was tolerated well with no complications.

## 2024-05-17 NOTE — IMAGING
[Bilateral] : knee bilaterally [All Views] : anteroposterior, lateral, skyline, and anteroposterior standing [advanced tricompartmental OA with medial compartment narrowing and varus alignment] : advanced tricompartmental OA with medial compartment narrowing and varus alignment [de-identified] : (Exam: Knee)   Laterality is bilateral   Patient is in no acute distress, alert and oriented Sensation is grossly intact to light touch in the foot Motor function is 5/5 in the foot Capillary refill is less than 2 seconds in the toes Lymphadenopathy is not present Peripheral edema is not present   Skin is intact Effusion is trace Atrophy is not present Antalgic gait is present   Medial joint line tenderness is present Lateral joint line tenderness is present Patellar tenderness is present Calf tenderness is not present   Knee extension is 5 Knee flexion is 110   Quadriceps strength is 5-/5 Hamstring strength is 5/5   Lachman is normal Posterior drawer is normal Varus stress stability is normal Valgus stress stability is normal   Medial lAex test is abnormal Lateral Alex test is normal Patellofemoral grind test is abnormal Patellar apprehension test is normal

## 2024-05-24 ENCOUNTER — APPOINTMENT (OUTPATIENT)
Dept: ORTHOPEDIC SURGERY | Facility: CLINIC | Age: 89
End: 2024-05-24
Payer: MEDICARE

## 2024-05-24 VITALS — BODY MASS INDEX: 23.92 KG/M2 | HEIGHT: 62 IN | WEIGHT: 130 LBS

## 2024-05-24 PROCEDURE — 20610 DRAIN/INJ JOINT/BURSA W/O US: CPT | Mod: 50

## 2024-05-24 NOTE — HISTORY OF PRESENT ILLNESS
[de-identified] : 5/24/24: Follow up bilateral knees. Patient is here for Gelsyn #2. no relief so far.   5/17/24: Follow up bilateral knees. Patient had CSI injection last visit with no relief.   Date of initial evaluation is 05/03/2024 Patient age is 89 year Occupation is retired  Body part causing symptoms is the bilateral knees (LT>RT) Symptoms began  years ago, worse for the past 1 year, no injury Location of pain is medial Quality of pain is dull Pain score at rest is 7 Pain score during activity 10 Radicular symptoms are not present Prior treatments include tylenol Patient's condition is not associated with workers compensation, no-fault or interscholastic athletics

## 2024-05-24 NOTE — DISCUSSION/SUMMARY
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 The potential non-surgical and surgical treatments were reviewed. The relative risks and benefits of each option were considered relative to the patients age, activity level, medical history, symptom severity and previously attempted treatments.   The patient was advised to consult with their primary medical provider prior to initiation of any new medications to reduce the risk of adverse effects specific to their long-term home medications and medical history. The risk of gastrointestinal irritation and kidney injury specific to long-term NSAID use was discussed.  -Hyaluronic acid injection will be performed for symptom relief. The patient has severe degenerative joint disease that has not improved with multiple non-surgical modalities including cortisone injection, rest and oral pain medication. -Patient defers PT at this time. -Over the counter acetaminophen as needed for pain. -Follow up in 1 week for final injection.    ([facility])   Problem Complexity -Moderate: chronic illness with exacerbation   Risk -Moderate: injection   (Procedure: Hyaluronic Acid Injection)   Injection location was the: -left knee joint -right knee joint   Injection contents were: -Gelsyn-3   Procedure Details: -Informed consent was obtained. Discussed possible risks of hyaluronic acid injection including infection and local inflammatory reaction. -Discussed that due to infection risk, an interval between injection and any future surgery is 6 weeks for arthroscopy and 3 months for arthroplasty. -Injection performed using aseptic technique. Hemostasis was confirmed. -Procedure was tolerated well with no complications.

## 2024-05-24 NOTE — IMAGING
[Bilateral] : knee bilaterally [All Views] : anteroposterior, lateral, skyline, and anteroposterior standing [advanced tricompartmental OA with medial compartment narrowing and varus alignment] : advanced tricompartmental OA with medial compartment narrowing and varus alignment [de-identified] : (Exam: Knee)   Laterality is bilateral   Patient is in no acute distress, alert and oriented Sensation is grossly intact to light touch in the foot Motor function is 5/5 in the foot Capillary refill is less than 2 seconds in the toes Lymphadenopathy is not present Peripheral edema is not present   Skin is intact Effusion is trace Atrophy is not present Antalgic gait is present   Medial joint line tenderness is present Lateral joint line tenderness is present Patellar tenderness is present Calf tenderness is not present   Knee extension is 5 Knee flexion is 110   Quadriceps strength is 5-/5 Hamstring strength is 5/5   Lachman is normal Posterior drawer is normal Varus stress stability is normal Valgus stress stability is normal   Medial Alex test is abnormal Lateral Alex test is normal Patellofemoral grind test is abnormal Patellar apprehension test is normal

## 2024-05-31 ENCOUNTER — APPOINTMENT (OUTPATIENT)
Dept: ORTHOPEDIC SURGERY | Facility: CLINIC | Age: 89
End: 2024-05-31
Payer: MEDICARE

## 2024-05-31 VITALS — BODY MASS INDEX: 23.92 KG/M2 | HEIGHT: 62 IN | WEIGHT: 130 LBS

## 2024-05-31 DIAGNOSIS — M17.12 UNILATERAL PRIMARY OSTEOARTHRITIS, LEFT KNEE: ICD-10-CM

## 2024-05-31 DIAGNOSIS — M17.11 UNILATERAL PRIMARY OSTEOARTHRITIS, RIGHT KNEE: ICD-10-CM

## 2024-05-31 PROCEDURE — 20610 DRAIN/INJ JOINT/BURSA W/O US: CPT | Mod: 50

## 2024-05-31 NOTE — HISTORY OF PRESENT ILLNESS
[de-identified] : 05/31/24: Follow up bilateral knees. Patient is here for Gelysn #3. minimal relief so far.   5/24/24: Follow up bilateral knees. Patient is here for Gelsyn #2. no relief so far.   5/17/24: Follow up bilateral knees. Patient had CSI injection last visit with no relief.   Date of initial evaluation is 05/03/2024 Patient age is 89 year Occupation is retired  Body part causing symptoms is the bilateral knees (LT>RT) Symptoms began  years ago, worse for the past 1 year, no injury Location of pain is medial Quality of pain is dull Pain score at rest is 7 Pain score during activity 10 Radicular symptoms are not present Prior treatments include tylenol Patient's condition is not associated with workers compensation, no-fault or interscholastic athletics

## 2024-05-31 NOTE — IMAGING
[Bilateral] : knee bilaterally [All Views] : anteroposterior, lateral, skyline, and anteroposterior standing [advanced tricompartmental OA with medial compartment narrowing and varus alignment] : advanced tricompartmental OA with medial compartment narrowing and varus alignment [de-identified] : (Exam: Knee)   Laterality is bilateral   Patient is in no acute distress, alert and oriented Sensation is grossly intact to light touch in the foot Motor function is 5/5 in the foot Capillary refill is less than 2 seconds in the toes Lymphadenopathy is not present Peripheral edema is not present   Skin is intact Effusion is trace Atrophy is not present Antalgic gait is present   Medial joint line tenderness is present Lateral joint line tenderness is present Patellar tenderness is present Calf tenderness is not present   Knee extension is 5 Knee flexion is 110   Quadriceps strength is 5-/5 Hamstring strength is 5/5   Lachman is normal Posterior drawer is normal Varus stress stability is normal Valgus stress stability is normal   Medial Alex test is abnormal Lateral Alex test is normal Patellofemoral grind test is abnormal Patellar apprehension test is normal

## 2024-05-31 NOTE — DISCUSSION/SUMMARY
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 The potential non-surgical and surgical treatments were reviewed. The relative risks and benefits of each option were considered relative to the patients age, activity level, medical history, symptom severity and previously attempted treatments.   The patient was advised to consult with their primary medical provider prior to initiation of any new medications to reduce the risk of adverse effects specific to their long-term home medications and medical history. The risk of gastrointestinal irritation and kidney injury specific to long-term NSAID use was discussed.  -Hyaluronic acid injection will be performed for symptom relief. The patient has severe degenerative joint disease that has not improved with multiple non-surgical modalities including cortisone injection, rest and oral pain medication. -Patient defers PT at this time. -Over the counter acetaminophen as needed for pain. -Follow up as needed.    ([facility])   Problem Complexity -Moderate: chronic illness with exacerbation   Risk -Moderate: injection   (Procedure: Hyaluronic Acid Injection)   Injection location was the: -left knee joint -right knee joint   Injection contents were: -Gelsyn-3   Procedure Details: -Informed consent was obtained. Discussed possible risks of hyaluronic acid injection including infection and local inflammatory reaction. -Discussed that due to infection risk, an interval between injection and any future surgery is 6 weeks for arthroscopy and 3 months for arthroplasty. -Injection performed using aseptic technique. Hemostasis was confirmed. -Procedure was tolerated well with no complications.

## 2024-09-05 ENCOUNTER — NON-APPOINTMENT (OUTPATIENT)
Age: 89
End: 2024-09-05

## 2024-09-05 ENCOUNTER — APPOINTMENT (OUTPATIENT)
Dept: OPHTHALMOLOGY | Facility: CLINIC | Age: 89
End: 2024-09-05
Payer: MEDICARE

## 2024-09-05 PROCEDURE — 92012 INTRM OPH EXAM EST PATIENT: CPT

## 2024-09-23 ENCOUNTER — APPOINTMENT (OUTPATIENT)
Dept: ORTHOPEDIC SURGERY | Facility: CLINIC | Age: 89
End: 2024-09-23
Payer: MEDICARE

## 2024-09-23 DIAGNOSIS — M17.12 UNILATERAL PRIMARY OSTEOARTHRITIS, LEFT KNEE: ICD-10-CM

## 2024-09-23 DIAGNOSIS — M17.11 UNILATERAL PRIMARY OSTEOARTHRITIS, RIGHT KNEE: ICD-10-CM

## 2024-09-23 PROCEDURE — 99213 OFFICE O/P EST LOW 20 MIN: CPT

## 2024-09-23 NOTE — HISTORY OF PRESENT ILLNESS
[de-identified] : 09/23/2024: f/u for B/L knees. Last gel inj 05/31/2024 with minimal relief. PT 2x a week. had CSI with pain management last week without relief. Taking Tramadol every night.   05/31/24: Follow up bilateral knees. Patient is here for Gelysn #3. minimal relief so far.   5/24/24: Follow up bilateral knees. Patient is here for Gelsyn #2. no relief so far.   5/17/24: Follow up bilateral knees. Patient had CSI injection last visit with no relief.   Date of initial evaluation is 05/03/2024 Patient age is 89 year Occupation is retired  Body part causing symptoms is the bilateral knees (LT>RT) Symptoms began  years ago, worse for the past 1 year, no injury Location of pain is medial Quality of pain is dull Pain score at rest is 7 Pain score during activity 10 Radicular symptoms are not present Prior treatments include tylenol Patient's condition is not associated with workers compensation, no-fault or interscholastic athletics

## 2024-09-23 NOTE — IMAGING
[Bilateral] : knee bilaterally [All Views] : anteroposterior, lateral, skyline, and anteroposterior standing [advanced tricompartmental OA with medial compartment narrowing and varus alignment] : advanced tricompartmental OA with medial compartment narrowing and varus alignment [de-identified] : (Exam: Knee)   Laterality is bilateral   Patient is in no acute distress, alert and oriented Sensation is grossly intact to light touch in the foot Motor function is 5/5 in the foot Capillary refill is less than 2 seconds in the toes Lymphadenopathy is not present Peripheral edema is not present   Skin is intact Effusion is trace Atrophy is not present Antalgic gait is present   Medial joint line tenderness is present Lateral joint line tenderness is present Patellar tenderness is present Calf tenderness is not present   Knee extension is 5 Knee flexion is 110   Quadriceps strength is 5-/5 Hamstring strength is 5/5   Lachman is normal Posterior drawer is normal Varus stress stability is normal Valgus stress stability is normal   Medial Alex test is abnormal Lateral Alex test is normal Patellofemoral grind test is abnormal Patellar apprehension test is normal

## 2024-09-23 NOTE — DISCUSSION/SUMMARY
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 The potential non-surgical and surgical treatments were reviewed. The relative risks and benefits of each option were considered relative to the patients age, activity level, medical history, symptom severity and previously attempted treatments.   The patient was advised to consult with their primary medical provider prior to initiation of any new medications to reduce the risk of adverse effects specific to their long-term home medications and medical history. The risk of gastrointestinal irritation and kidney injury specific to long-term NSAID use was discussed.  -Additional injection is deferred due to recent injections.  -Continue PT. -Over the counter acetaminophen as needed for pain.  -Pain management follow up as scheduled.  -Follow up in 3 months, consider repeat HA if pain persists.    ([facility])   Problem Complexity -Moderate: chronic illness with exacerbation   Risk -Low: over the counter medication

## 2024-10-14 ENCOUNTER — NON-APPOINTMENT (OUTPATIENT)
Age: 89
End: 2024-10-14

## 2024-10-14 ENCOUNTER — APPOINTMENT (OUTPATIENT)
Dept: OPHTHALMOLOGY | Facility: CLINIC | Age: 89
End: 2024-10-14
Payer: MEDICARE

## 2024-10-14 PROCEDURE — 92134 CPTRZ OPH DX IMG PST SGM RTA: CPT

## 2024-10-14 PROCEDURE — 92014 COMPRE OPH EXAM EST PT 1/>: CPT

## 2024-12-09 ENCOUNTER — APPOINTMENT (OUTPATIENT)
Dept: ORTHOPEDIC SURGERY | Facility: CLINIC | Age: 88
End: 2024-12-09

## 2025-04-28 ENCOUNTER — TRANSCRIPTION ENCOUNTER (OUTPATIENT)
Age: 89
End: 2025-04-28

## 2025-05-06 ENCOUNTER — TRANSCRIPTION ENCOUNTER (OUTPATIENT)
Age: 89
End: 2025-05-06

## 2025-06-16 ENCOUNTER — APPOINTMENT (OUTPATIENT)
Dept: OPHTHALMOLOGY | Facility: CLINIC | Age: 89
End: 2025-06-16
Payer: MEDICARE

## 2025-06-16 PROCEDURE — 92083 EXTENDED VISUAL FIELD XM: CPT

## 2025-06-16 PROCEDURE — ZZZZZ: CPT | Mod: NC

## 2025-06-16 PROCEDURE — 92133 CPTRZD OPH DX IMG PST SGM ON: CPT

## 2025-06-25 ENCOUNTER — APPOINTMENT (OUTPATIENT)
Dept: PULMONOLOGY | Facility: CLINIC | Age: 89
End: 2025-06-25
Payer: MEDICARE

## 2025-06-25 PROCEDURE — 94727 GAS DIL/WSHOT DETER LNG VOL: CPT | Mod: TC

## 2025-06-25 PROCEDURE — 94729 DIFFUSING CAPACITY: CPT | Mod: TC

## 2025-06-25 PROCEDURE — 94060 EVALUATION OF WHEEZING: CPT | Mod: TC

## 2025-06-25 RX ORDER — ALBUTEROL SULFATE 2.5 MG/3ML
(2.5 MG/3ML) SOLUTION RESPIRATORY (INHALATION)
Qty: 0 | Refills: 0 | Status: COMPLETED | OUTPATIENT
Start: 2025-06-25

## 2025-06-25 RX ADMIN — Medication 0 0.083%: at 00:00

## 2025-07-07 ENCOUNTER — APPOINTMENT (OUTPATIENT)
Dept: OPHTHALMOLOGY | Facility: CLINIC | Age: 89
End: 2025-07-07
Payer: MEDICARE

## 2025-07-07 ENCOUNTER — NON-APPOINTMENT (OUTPATIENT)
Age: 89
End: 2025-07-07

## 2025-07-07 PROCEDURE — 92134 CPTRZ OPH DX IMG PST SGM RTA: CPT

## 2025-07-07 PROCEDURE — 92014 COMPRE OPH EXAM EST PT 1/>: CPT
